# Patient Record
Sex: FEMALE | Race: WHITE | HISPANIC OR LATINO | Employment: FULL TIME | URBAN - METROPOLITAN AREA
[De-identification: names, ages, dates, MRNs, and addresses within clinical notes are randomized per-mention and may not be internally consistent; named-entity substitution may affect disease eponyms.]

---

## 2019-01-15 ENCOUNTER — HOSPITAL ENCOUNTER (OUTPATIENT)
Dept: MRI IMAGING | Facility: HOSPITAL | Age: 59
Discharge: HOME | End: 2019-01-15
Attending: NURSE PRACTITIONER

## 2019-01-25 ENCOUNTER — HOSPITAL ENCOUNTER (OUTPATIENT)
Dept: OTHER | Facility: HOSPITAL | Age: 59
Discharge: HOME | End: 2019-01-25
Attending: NURSE PRACTITIONER

## 2019-01-25 LAB
25(OH)D3 SERPL-MCNC: 16.2 NG/ML (ref 30–100)
ALBUMIN SERPL-MCNC: 4.4 G/DL (ref 3.5–5)
ALBUMIN/GLOB SERPL: 1.5 {RATIO} (ref 1.4–2.6)
ALP SERPL-CCNC: 100 U/L (ref 53–141)
ALT SERPL-CCNC: 36 U/L (ref 10–40)
ANION GAP SERPL CALC-SCNC: 14 MMOL/L (ref 8–19)
AST SERPL-CCNC: 28 U/L (ref 15–50)
BASOPHILS # BLD AUTO: 0.06 10*3/UL (ref 0–0.2)
BASOPHILS NFR BLD AUTO: 0.85 % (ref 0–3)
BILIRUB SERPL-MCNC: 0.77 MG/DL (ref 0.2–1.3)
BUN SERPL-MCNC: 11 MG/DL (ref 5–25)
BUN/CREAT SERPL: 14 {RATIO} (ref 6–20)
CALCIUM SERPL-MCNC: 9.3 MG/DL (ref 8.7–10.4)
CHLORIDE SERPL-SCNC: 106 MMOL/L (ref 99–111)
CHOLEST SERPL-MCNC: 206 MG/DL (ref 107–200)
CHOLEST/HDLC SERPL: 5 {RATIO} (ref 3–6)
CONV CO2: 26 MMOL/L (ref 22–32)
CONV TOTAL PROTEIN: 7.3 G/DL (ref 6.3–8.2)
CREAT UR-MCNC: 0.79 MG/DL (ref 0.5–0.9)
EOSINOPHIL # BLD AUTO: 0.14 10*3/UL (ref 0–0.7)
EOSINOPHIL # BLD AUTO: 1.85 % (ref 0–7)
ERYTHROCYTE [DISTWIDTH] IN BLOOD BY AUTOMATED COUNT: 12.7 % (ref 11.5–14.5)
FOLATE SERPL-MCNC: 15.1 NG/ML (ref 4.8–20)
GFR SERPLBLD BASED ON 1.73 SQ M-ARVRAT: >60 ML/MIN/{1.73_M2}
GLOBULIN UR ELPH-MCNC: 2.9 G/DL (ref 2–3.5)
GLUCOSE SERPL-MCNC: 102 MG/DL (ref 65–99)
HBA1C MFR BLD: 14 G/DL (ref 12–16)
HCT VFR BLD AUTO: 40.8 % (ref 37–47)
HDLC SERPL-MCNC: 41 MG/DL (ref 40–60)
IRON SATN MFR SERPL: 26 % (ref 20–55)
IRON SERPL-MCNC: 101 UG/DL (ref 60–170)
LDLC SERPL CALC-MCNC: 126 MG/DL (ref 70–100)
LYMPHOCYTES # BLD AUTO: 2.71 10*3/UL (ref 1–5)
MCH RBC QN AUTO: 29.1 PG (ref 27–31)
MCHC RBC AUTO-ENTMCNC: 34.3 G/DL (ref 33–37)
MCV RBC AUTO: 84.6 FL (ref 81–99)
MONOCYTES # BLD AUTO: 0.42 10*3/UL (ref 0.2–1.2)
MONOCYTES NFR BLD AUTO: 5.59 % (ref 3–10)
NEUTROPHILS # BLD AUTO: 4.21 10*3/UL (ref 2–8)
NEUTROPHILS NFR BLD AUTO: 55.8 % (ref 30–85)
NRBC BLD AUTO-RTO: 0 % (ref 0–0.01)
OSMOLALITY SERPL CALC.SUM OF ELEC: 294 MOSM/KG (ref 273–304)
PLATELET # BLD AUTO: 278 10*3/UL (ref 130–400)
PMV BLD AUTO: 8.6 FL (ref 7.4–10.4)
POTASSIUM SERPL-SCNC: 4 MMOL/L (ref 3.5–5.3)
RBC # BLD AUTO: 4.82 10*6/UL (ref 4.2–5.4)
SODIUM SERPL-SCNC: 142 MMOL/L (ref 135–147)
T4 FREE SERPL-MCNC: 1.1 NG/DL (ref 0.9–1.8)
TIBC SERPL-MCNC: 392 UG/DL (ref 245–450)
TRANSFERRIN SERPL-MCNC: 274 MG/DL (ref 250–380)
TRIGL SERPL-MCNC: 196 MG/DL (ref 40–150)
TSH SERPL-ACNC: 1.17 M[IU]/L (ref 0.27–4.2)
VARIANT LYMPHS NFR BLD MANUAL: 35.9 % (ref 20–45)
VIT B12 SERPL-MCNC: 368 PG/ML (ref 211–911)
VLDLC SERPL-MCNC: 39 MG/DL (ref 5–37)
WBC # BLD AUTO: 7.54 10*3/UL (ref 4.8–10.8)

## 2019-02-08 ENCOUNTER — HOSPITAL ENCOUNTER (OUTPATIENT)
Dept: MRI IMAGING | Facility: HOSPITAL | Age: 59
Discharge: HOME | End: 2019-02-08
Attending: NURSE PRACTITIONER

## 2019-03-04 ENCOUNTER — HOSPITAL ENCOUNTER (OUTPATIENT)
Dept: MAMMOGRAPHY | Facility: HOSPITAL | Age: 59
Discharge: HOME | End: 2019-03-04
Attending: NURSE PRACTITIONER

## 2019-03-28 ENCOUNTER — CONVERSION ENCOUNTER (OUTPATIENT)
Dept: NEUROLOGY | Facility: CLINIC | Age: 59
End: 2019-03-28

## 2019-03-28 ENCOUNTER — OFFICE VISIT CONVERTED (OUTPATIENT)
Dept: NEUROSURGERY | Facility: CLINIC | Age: 59
End: 2019-03-28
Attending: NEUROLOGICAL SURGERY

## 2019-05-01 ENCOUNTER — HOSPITAL ENCOUNTER (OUTPATIENT)
Dept: PHYSICAL THERAPY | Facility: CLINIC | Age: 59
Setting detail: RECURRING SERIES
Discharge: STILL ADMITTED OR ADMINISTRATIVE | End: 2019-08-06
Attending: NEUROLOGICAL SURGERY

## 2019-05-17 ENCOUNTER — HOSPITAL ENCOUNTER (OUTPATIENT)
Dept: OTHER | Facility: HOSPITAL | Age: 59
Discharge: HOME | End: 2019-05-17
Attending: NURSE PRACTITIONER

## 2019-05-17 LAB
FOLATE SERPL-MCNC: 10.9 NG/ML (ref 4.8–20)
VIT B12 SERPL-MCNC: 1290 PG/ML (ref 211–911)

## 2019-08-01 ENCOUNTER — HOSPITAL ENCOUNTER (OUTPATIENT)
Dept: LAB | Facility: HOSPITAL | Age: 59
Discharge: HOME | End: 2019-08-01

## 2019-08-07 ENCOUNTER — HOSPITAL ENCOUNTER (OUTPATIENT)
Dept: PHYSICAL THERAPY | Facility: CLINIC | Age: 59
Setting detail: RECURRING SERIES
Discharge: HOME | End: 2019-11-25
Attending: NEUROLOGICAL SURGERY

## 2019-09-09 ENCOUNTER — OFFICE VISIT CONVERTED (OUTPATIENT)
Dept: CARDIOLOGY | Facility: CLINIC | Age: 59
End: 2019-09-09
Attending: INTERNAL MEDICINE

## 2019-10-11 ENCOUNTER — HOSPITAL ENCOUNTER (OUTPATIENT)
Dept: GENERAL RADIOLOGY | Facility: HOSPITAL | Age: 59
Discharge: HOME | End: 2019-10-11
Attending: NURSE PRACTITIONER

## 2019-10-31 ENCOUNTER — OFFICE VISIT (OUTPATIENT)
Dept: SPORTS MEDICINE | Facility: CLINIC | Age: 59
End: 2019-10-31

## 2019-10-31 VITALS
BODY MASS INDEX: 37.16 KG/M2 | SYSTOLIC BLOOD PRESSURE: 128 MMHG | HEART RATE: 82 BPM | HEIGHT: 58 IN | OXYGEN SATURATION: 94 % | WEIGHT: 177 LBS | DIASTOLIC BLOOD PRESSURE: 84 MMHG

## 2019-10-31 DIAGNOSIS — S83.271A COMPLEX TEAR OF LATERAL MENISCUS OF RIGHT KNEE AS CURRENT INJURY, INITIAL ENCOUNTER: Primary | ICD-10-CM

## 2019-10-31 PROCEDURE — 99203 OFFICE O/P NEW LOW 30 MIN: CPT | Performed by: FAMILY MEDICINE

## 2019-10-31 RX ORDER — LISINOPRIL AND HYDROCHLOROTHIAZIDE 12.5; 1 MG/1; MG/1
1 TABLET ORAL EVERY MORNING
Refills: 3 | COMMUNITY
Start: 2019-09-30 | End: 2021-10-07

## 2019-10-31 RX ORDER — HYDROCHLOROTHIAZIDE 12.5 MG/1
TABLET ORAL
Refills: 0 | COMMUNITY
Start: 2019-09-30 | End: 2019-10-31 | Stop reason: SDUPTHER

## 2019-10-31 RX ORDER — CHOLECALCIFEROL TAB 125 MCG (5000 UNIT) 125 MCG (5000 UT)
1 TAB DAILY
Refills: 0 | COMMUNITY
Start: 2019-09-29

## 2019-10-31 RX ORDER — TRAZODONE HYDROCHLORIDE 50 MG/1
50 TABLET ORAL
Refills: 1 | COMMUNITY
Start: 2019-08-22 | End: 2021-10-07

## 2019-10-31 RX ORDER — DICLOFENAC SODIUM 75 MG/1
TABLET, DELAYED RELEASE ORAL
Refills: 5 | COMMUNITY
Start: 2019-08-30

## 2019-10-31 RX ORDER — QUETIAPINE FUMARATE 25 MG/1
25 TABLET, FILM COATED ORAL
Refills: 0 | COMMUNITY
Start: 2019-08-20 | End: 2022-01-07

## 2019-10-31 RX ORDER — DIAZEPAM 10 MG/1
TABLET ORAL
Refills: 0 | COMMUNITY
Start: 2019-10-11 | End: 2021-10-07

## 2019-11-07 ENCOUNTER — TELEPHONE (OUTPATIENT)
Dept: SPORTS MEDICINE | Facility: CLINIC | Age: 59
End: 2019-11-07

## 2019-11-07 NOTE — TELEPHONE ENCOUNTER
Pt states that she has PRP tomorrow and stated that if she had any question before hand to call and discuss with you.  [phone]

## 2019-11-08 ENCOUNTER — OFFICE VISIT (OUTPATIENT)
Dept: SPORTS MEDICINE | Facility: CLINIC | Age: 59
End: 2019-11-08

## 2019-11-08 VITALS
WEIGHT: 177 LBS | BODY MASS INDEX: 37.16 KG/M2 | HEIGHT: 58 IN | DIASTOLIC BLOOD PRESSURE: 66 MMHG | SYSTOLIC BLOOD PRESSURE: 110 MMHG | OXYGEN SATURATION: 95 % | HEART RATE: 78 BPM

## 2019-11-08 DIAGNOSIS — S83.271D COMPLEX TEAR OF LATERAL MENISCUS OF RIGHT KNEE AS CURRENT INJURY, SUBSEQUENT ENCOUNTER: Primary | ICD-10-CM

## 2019-11-08 PROCEDURE — 0232T NJX PLATELET PLASMA: CPT | Performed by: FAMILY MEDICINE

## 2019-11-13 NOTE — PROGRESS NOTES
Here today for PRP injection for right knee lateral meniscus tear.  We again reviewed available data for PRP, pros, cons, alternatives, available data.  Written consent was verified with the patient.    PRP collected and  by standard [employer] ACP protocol.    Under sterile technique and continuous ultrasound visualization PRP was injected directly into the knee joint.  Procedure was tolerated well.    Recommend rest for the first 48 hours as much as possible, followed by relatively light activity the first 10 days.  At that time recommend starting the stability strengthening in the physical therapy context.  Discussed expectations, expect slightly increased pain for the first week and do not expect improvement until 4 to 6 weeks after injection.    Diagnoses and all orders for this visit:    Complex tear of lateral meniscus of right knee as current injury, subsequent encounter

## 2019-12-19 ENCOUNTER — OFFICE VISIT (OUTPATIENT)
Dept: SPORTS MEDICINE | Facility: CLINIC | Age: 59
End: 2019-12-19

## 2019-12-19 VITALS
DIASTOLIC BLOOD PRESSURE: 62 MMHG | HEIGHT: 58 IN | WEIGHT: 178.8 LBS | HEART RATE: 89 BPM | SYSTOLIC BLOOD PRESSURE: 100 MMHG | RESPIRATION RATE: 16 BRPM | BODY MASS INDEX: 37.53 KG/M2 | OXYGEN SATURATION: 98 % | TEMPERATURE: 97.9 F

## 2019-12-19 DIAGNOSIS — S83.271D COMPLEX TEAR OF LATERAL MENISCUS OF RIGHT KNEE AS CURRENT INJURY, SUBSEQUENT ENCOUNTER: Primary | ICD-10-CM

## 2019-12-19 PROCEDURE — 99213 OFFICE O/P EST LOW 20 MIN: CPT | Performed by: FAMILY MEDICINE

## 2019-12-19 NOTE — PROGRESS NOTES
"Rocío is a 59 y.o. year old female follow up on a problem familiar to this examiner.     Chief Complaint   Patient presents with   • Follow-up     prp 6 weeks ago  rt knee       History of Present Illness  HPI   F/u R knee PRP injection. Pain improved 85%.  Shin pain and night pain resolved  Flexion is much better  Still has trouble with full extension and squatting.      I have reviewed the patient's medical, family, and social history in detail and updated the computerized patient record.    Review of Systems   Constitutional: Negative.        /62   Pulse 89   Temp 97.9 °F (36.6 °C)   Resp 16   Ht 147.3 cm (58\")   Wt 81.1 kg (178 lb 12.8 oz)   SpO2 98%   BMI 37.37 kg/m²      Physical Exam    Vital signs reviewed.   General: No acute distress.  Eyes: conjunctiva clear; pupils equally round and reactive  ENT: external ears and nose atraumatic; oropharynx clear  CV: no peripheral edema, 2+ distal pulses  Resp: normal respiratory effort, no use of accessory muscles  Skin: no rashes or wounds; normal turgor  Psych: mood and affect appropriate; recent and remote memory intact  Neuro: sensation to light touch intact    MSK Exam:  Ortho Exam  Right knee: Normal appearance.  There appears to be a very small effusion.  There is mild medial joint line tenderness palpation.  Range of motion but a positive bounce home.  No ligamentous laxity.      Diagnoses and all orders for this visit:    Complex tear of lateral meniscus of right knee as current injury, subsequent encounter    Overall she appears to be responding very well.  We discussed continued conservative management with progressive gentle physical therapy.  I do think would be worthwhile for her to add some anti-inflammatory treatment given her mild effusion to help her continue to progress with therapeutic stability strengthening.  Discussed potential need for repeat injection should she reach plateau in her improvement.  Also discussed potential need for " repeat injection months or years ago.  Follow-up as needed based on that progression.      EMR Dragon/Transcription disclaimer:    Much of this encounter note is an electronic transcription/translation of spoken language to printed text.  The electronic translation of spoken language may permit erroneous, or at times, nonsensical words or phrases to be inadvertently transcribed.  Although I have reviewed the note for such errors some may still exist.

## 2020-07-11 ENCOUNTER — HOSPITAL ENCOUNTER (OUTPATIENT)
Dept: OTHER | Facility: HOSPITAL | Age: 60
Discharge: HOME | End: 2020-07-11
Attending: NURSE PRACTITIONER

## 2020-07-11 ENCOUNTER — HOSPITAL ENCOUNTER (OUTPATIENT)
Dept: OTHER | Facility: HOSPITAL | Age: 60
Discharge: HOME | End: 2020-07-11

## 2020-07-11 LAB
25(OH)D3 SERPL-MCNC: 26.7 NG/ML (ref 30–100)
ALBUMIN SERPL-MCNC: 4.2 G/DL (ref 3.5–5)
ALBUMIN/GLOB SERPL: 1.3 {RATIO} (ref 1.4–2.6)
ALP SERPL-CCNC: 72 U/L (ref 53–141)
ALT SERPL-CCNC: 26 U/L (ref 10–40)
ANION GAP SERPL CALC-SCNC: 16 MMOL/L (ref 8–19)
AST SERPL-CCNC: 23 U/L (ref 15–50)
BASOPHILS # BLD AUTO: 0.06 10*3/UL (ref 0–0.2)
BASOPHILS NFR BLD AUTO: 0.9 % (ref 0–3)
BILIRUB SERPL-MCNC: 0.56 MG/DL (ref 0.2–1.3)
BUN SERPL-MCNC: 15 MG/DL (ref 5–25)
BUN/CREAT SERPL: 19 {RATIO} (ref 6–20)
CALCIUM SERPL-MCNC: 9.3 MG/DL (ref 8.7–10.4)
CHLORIDE SERPL-SCNC: 102 MMOL/L (ref 99–111)
CHOLEST SERPL-MCNC: 223 MG/DL (ref 107–200)
CHOLEST/HDLC SERPL: 5.1 {RATIO} (ref 3–6)
CONV ABS IMM GRAN: 0.02 10*3/UL (ref 0–0.2)
CONV CO2: 25 MMOL/L (ref 22–32)
CONV IMMATURE GRAN: 0.3 % (ref 0–1.8)
CONV TOTAL PROTEIN: 7.4 G/DL (ref 6.3–8.2)
CREAT UR-MCNC: 0.81 MG/DL (ref 0.5–0.9)
DEPRECATED RDW RBC AUTO: 44 FL (ref 36.4–46.3)
EOSINOPHIL # BLD AUTO: 0.11 10*3/UL (ref 0–0.7)
EOSINOPHIL # BLD AUTO: 1.7 % (ref 0–7)
ERYTHROCYTE [DISTWIDTH] IN BLOOD BY AUTOMATED COUNT: 13.6 % (ref 11.7–14.4)
FOLATE SERPL-MCNC: 12.4 NG/ML (ref 4.8–20)
GFR SERPLBLD BASED ON 1.73 SQ M-ARVRAT: >60 ML/MIN/{1.73_M2}
GLOBULIN UR ELPH-MCNC: 3.2 G/DL (ref 2–3.5)
GLUCOSE SERPL-MCNC: 111 MG/DL (ref 65–99)
HCT VFR BLD AUTO: 41 % (ref 37–47)
HDLC SERPL-MCNC: 44 MG/DL (ref 40–60)
HGB BLD-MCNC: 13.2 G/DL (ref 12–16)
IRON SERPL-MCNC: 73 UG/DL (ref 60–170)
LDLC SERPL CALC-MCNC: 155 MG/DL (ref 70–100)
LYMPHOCYTES # BLD AUTO: 2.37 10*3/UL (ref 1–5)
LYMPHOCYTES NFR BLD AUTO: 35.9 % (ref 20–45)
MCH RBC QN AUTO: 28.4 PG (ref 27–31)
MCHC RBC AUTO-ENTMCNC: 32.2 G/DL (ref 33–37)
MCV RBC AUTO: 88.2 FL (ref 81–99)
MONOCYTES # BLD AUTO: 0.38 10*3/UL (ref 0.2–1.2)
MONOCYTES NFR BLD AUTO: 5.8 % (ref 3–10)
NEUTROPHILS # BLD AUTO: 3.66 10*3/UL (ref 2–8)
NEUTROPHILS NFR BLD AUTO: 55.4 % (ref 30–85)
NRBC CBCN: 0 % (ref 0–0.7)
OSMOLALITY SERPL CALC.SUM OF ELEC: 290 MOSM/KG (ref 273–304)
PLATELET # BLD AUTO: 275 10*3/UL (ref 130–400)
PMV BLD AUTO: 11.1 FL (ref 9.4–12.3)
POTASSIUM SERPL-SCNC: 4.1 MMOL/L (ref 3.5–5.3)
RBC # BLD AUTO: 4.65 10*6/UL (ref 4.2–5.4)
SODIUM SERPL-SCNC: 139 MMOL/L (ref 135–147)
TRIGL SERPL-MCNC: 121 MG/DL (ref 40–150)
TSH SERPL-ACNC: 1.49 M[IU]/L (ref 0.27–4.2)
VIT B12 SERPL-MCNC: 418 PG/ML (ref 211–911)
VLDLC SERPL-MCNC: 24 MG/DL (ref 5–37)
WBC # BLD AUTO: 6.6 10*3/UL (ref 4.8–10.8)

## 2020-07-13 LAB
EST. AVERAGE GLUCOSE BLD GHB EST-MCNC: 128 MG/DL
HBA1C MFR BLD: 6.1 % (ref 3.5–5.7)

## 2020-11-11 ENCOUNTER — HOSPITAL ENCOUNTER (OUTPATIENT)
Dept: MAMMOGRAPHY | Facility: HOSPITAL | Age: 60
Discharge: HOME | End: 2020-11-11
Attending: NURSE PRACTITIONER

## 2020-12-30 ENCOUNTER — HOSPITAL ENCOUNTER (OUTPATIENT)
Dept: OTHER | Facility: HOSPITAL | Age: 60
Discharge: HOME | End: 2020-12-30
Attending: INTERNAL MEDICINE

## 2021-01-25 ENCOUNTER — HOSPITAL ENCOUNTER (OUTPATIENT)
Dept: OTHER | Facility: HOSPITAL | Age: 61
Discharge: HOME | End: 2021-01-25
Attending: INTERNAL MEDICINE

## 2021-05-15 VITALS
BODY MASS INDEX: 38.62 KG/M2 | DIASTOLIC BLOOD PRESSURE: 68 MMHG | SYSTOLIC BLOOD PRESSURE: 116 MMHG | HEART RATE: 76 BPM | HEIGHT: 58 IN | WEIGHT: 184 LBS

## 2021-05-15 VITALS
HEIGHT: 58 IN | BODY MASS INDEX: 39.28 KG/M2 | WEIGHT: 187.12 LBS | SYSTOLIC BLOOD PRESSURE: 144 MMHG | DIASTOLIC BLOOD PRESSURE: 81 MMHG

## 2021-08-16 ENCOUNTER — OFFICE VISIT (OUTPATIENT)
Dept: PLASTIC SURGERY | Facility: CLINIC | Age: 61
End: 2021-08-16

## 2021-08-16 ENCOUNTER — TELEPHONE (OUTPATIENT)
Dept: PLASTIC SURGERY | Facility: CLINIC | Age: 61
End: 2021-08-16

## 2021-08-16 VITALS — TEMPERATURE: 98.1 F

## 2021-08-16 DIAGNOSIS — R23.8 FACIAL AGING: Primary | ICD-10-CM

## 2021-08-16 PROCEDURE — COS63: Performed by: NURSE PRACTITIONER

## 2021-08-16 NOTE — TELEPHONE ENCOUNTER
Patient states everything went well with her visit today, she was pleased to be given the information and able to wait and think about how she would like to proceed and what options she would like to pursue.Patient states she had a good experience here today. Overall she is satisfied with her visit. She will be calling back regarding ZO products.

## 2021-08-16 NOTE — PROGRESS NOTES
Consult (discuss skincare)            History of Present Illness  Rocío José is a 61 y.o. female who presents to [facility] to discuss skincare.    Discussed Brown spots and texture of skin.  Patient most concerned about hollow area under eyes with bags, brown spots, and fine line areas around her mouth.     Subjective       Patient has no known allergies.  Allergies Reconciled.    Objective     Temp 98.1 °F (36.7 °C)     There is no height or weight on file to calculate BMI.    Physical Exam   Cardiovascular: Normal rate.     Pulmonary/Chest  Effort normal.     Face: Scattered brown spots, Dynamic greater than static rhytids of forehead, glabella, and periorbital areas, moderately deep NL folds, vertical cutaneous lip lines, no open areas or irritation, bilateral lower tear troughs with herniated fat pads  Result Review :       Procedures         Assessment and Plan      Diagnoses and all orders for this visit:    1. Facial aging (Primary)        Plan:  Recommending some botox around eyes to help with hollowing and or surgical upper and lower bleph.  Use retinol skin care.  Chemical peel.  Microneedling with PRP to help with fine lines around mouth. Started on ZO skin care, GSR, daily power, Brightening cream.        Follow Up     No follow-ups on file.    Patient was given instructions and counseling regarding her condition. Please see specific information pulled into the AVS if appropriate.     Tiffany Barron, JANES  08/16/2021

## 2021-10-07 PROCEDURE — 87086 URINE CULTURE/COLONY COUNT: CPT | Performed by: PHYSICIAN ASSISTANT

## 2021-10-07 PROCEDURE — 87088 URINE BACTERIA CULTURE: CPT | Performed by: PHYSICIAN ASSISTANT

## 2021-10-07 PROCEDURE — 87186 SC STD MICRODIL/AGAR DIL: CPT | Performed by: PHYSICIAN ASSISTANT

## 2021-10-19 ENCOUNTER — TRANSCRIBE ORDERS (OUTPATIENT)
Dept: ADMINISTRATIVE | Facility: HOSPITAL | Age: 61
End: 2021-10-19

## 2021-10-19 DIAGNOSIS — Z12.31 VISIT FOR SCREENING MAMMOGRAM: Primary | ICD-10-CM

## 2021-11-05 ENCOUNTER — TRANSCRIBE ORDERS (OUTPATIENT)
Dept: ADMINISTRATIVE | Facility: HOSPITAL | Age: 61
End: 2021-11-05

## 2021-11-05 DIAGNOSIS — Z12.31 VISIT FOR SCREENING MAMMOGRAM: Primary | ICD-10-CM

## 2021-11-16 ENCOUNTER — HOSPITAL ENCOUNTER (OUTPATIENT)
Dept: MAMMOGRAPHY | Facility: HOSPITAL | Age: 61
Discharge: HOME | End: 2021-11-16
Admitting: NURSE PRACTITIONER

## 2021-11-16 DIAGNOSIS — Z12.31 VISIT FOR SCREENING MAMMOGRAM: ICD-10-CM

## 2021-11-16 PROCEDURE — 77067 SCR MAMMO BI INCL CAD: CPT

## 2021-11-16 PROCEDURE — 77063 BREAST TOMOSYNTHESIS BI: CPT

## 2021-12-07 ENCOUNTER — OFFICE VISIT (OUTPATIENT)
Dept: OBSTETRICS AND GYNECOLOGY | Facility: CLINIC | Age: 61
End: 2021-12-07

## 2021-12-07 VITALS
SYSTOLIC BLOOD PRESSURE: 135 MMHG | DIASTOLIC BLOOD PRESSURE: 78 MMHG | HEART RATE: 84 BPM | BODY MASS INDEX: 38.62 KG/M2 | WEIGHT: 179 LBS | HEIGHT: 57 IN

## 2021-12-07 DIAGNOSIS — Z01.419 WELL WOMAN EXAM WITH ROUTINE GYNECOLOGICAL EXAM: Primary | ICD-10-CM

## 2021-12-07 DIAGNOSIS — R10.2 PELVIC PAIN: ICD-10-CM

## 2021-12-07 DIAGNOSIS — N95.2 VAGINAL ATROPHY: ICD-10-CM

## 2021-12-07 PROCEDURE — 87624 HPV HI-RISK TYP POOLED RSLT: CPT | Performed by: OBSTETRICS & GYNECOLOGY

## 2021-12-07 PROCEDURE — 99214 OFFICE O/P EST MOD 30 MIN: CPT | Performed by: OBSTETRICS & GYNECOLOGY

## 2021-12-07 PROCEDURE — G0123 SCREEN CERV/VAG THIN LAYER: HCPCS | Performed by: OBSTETRICS & GYNECOLOGY

## 2021-12-07 PROCEDURE — 99396 PREV VISIT EST AGE 40-64: CPT | Performed by: OBSTETRICS & GYNECOLOGY

## 2021-12-07 RX ORDER — ESTRADIOL 10 UG/1
1 INSERT VAGINAL 2 TIMES WEEKLY
Qty: 8 TABLET | Refills: 11 | Status: SHIPPED | OUTPATIENT
Start: 2021-12-09

## 2021-12-07 NOTE — PROGRESS NOTES
Well Woman Visit    CC: Scheduled annual well gyn visit  Chief Complaint   Patient presents with   • Gynecologic Exam     WWE       Myriad intake in the past?: Yes    DID NOT QUALIFY TODAY  Tobacco/Nicotine use:  No    Contraception or HRT: Post menopausal

## 2021-12-07 NOTE — PROGRESS NOTES
"Well Woman Visit    CC: WWE     Myriad intake: No  Previously Qualified? NO   Tobacco/Nicotine use:  No    HPI:   61 y.o.No obstetric history on file. Contraception or HRT: Tubal ligation  Patient is complaining of vaginal dryness which she has causing discomfort with intercourse. She is also complaining of intermittent shooting pelvic pain. She states this pain has 1-2 times a week and has been present for the last year. Unaware of any alleviating or aggravating factors.      History: PMHx, Meds, Allergies, PSHx, Social Hx, and POBHx all reviewed and updated.  Yana Ghosh APRN      Review of Systems   Genitourinary: Positive for dyspareunia and vaginal pain.        /78   Pulse 84   Ht 144.8 cm (57\")   Wt 81.2 kg (179 lb)   BMI 38.74 kg/m²     Physical Exam  Vitals and nursing note reviewed. Exam conducted with a chaperone present.   Constitutional:       Appearance: Normal appearance.   Neck:      Thyroid: No thyroid mass or thyromegaly.   Cardiovascular:      Rate and Rhythm: Normal rate and regular rhythm.      Heart sounds: Normal heart sounds.   Pulmonary:      Effort: Pulmonary effort is normal.      Breath sounds: Normal breath sounds.   Chest:   Breasts:      Right: Normal. No mass, nipple discharge or skin change.      Left: Normal. No mass, nipple discharge or skin change.       Abdominal:      General: Abdomen is flat. Bowel sounds are normal.      Palpations: Abdomen is soft.   Genitourinary:     General: Normal vulva.      Exam position: Lithotomy position.      Labia:         Right: No lesion.         Left: No lesion.       Urethra: No prolapse or urethral lesion.      Vagina: Normal. No lesions (mild to moderate atrophy; no narrowing; no levator ani spasm).      Cervix: Normal.      Uterus: Normal.       Adnexa: Right adnexa normal and left adnexa normal.      Rectum: No external hemorrhoid.   Musculoskeletal:      Cervical back: Full passive range of motion without pain.   Neurological: "      Mental Status: She is alert.         ASSESSMENT AND PLAN:  WWE    Diagnoses and all orders for this visit:    1. Well woman exam with routine gynecological exam (Primary)    2. Pelvic pain  Overview:  Sporadic, sharp      Orders:  -     US Non-ob Transvaginal; Future    3. Vaginal atrophy  Overview:  Discussed R/B of localized vaginal estrogen    Orders:  -     estradiol (Vagifem) 10 MCG tablet vaginal tablet; Insert 1 tablet into the vagina 2 (Two) Times a Week.  Dispense: 8 tablet; Refill: 11        Counseling:     HRT R/B/A/SE/E all options including non-FDA approved options reviewed    Preventative:   Colonoscopy  Follow up PCP/Specialist PMHx and Labs    Recommend FLU vaccine this season, R/B discussed  s/p COVID vaccine        She understands the importance of having any ordered tests to be performed in a timely fashion.  The risks of not performing them include, but are not limited to, advanced cancer stages, bone loss from osteoporosis and/or subsequent increase in morbidity and/or mortality.  She is encouraged to review her results online and/or contact or office if she has questions.     Follow Up:  Return for post ultrasound.        Cookie Ford MD  12/07/2021

## 2021-12-15 ENCOUNTER — TELEPHONE (OUTPATIENT)
Dept: OBSTETRICS AND GYNECOLOGY | Facility: CLINIC | Age: 61
End: 2021-12-15

## 2021-12-15 LAB
CONV .: ABNORMAL
CYTOLOGIST CVX/VAG CYTO: ABNORMAL
CYTOLOGY CVX/VAG DOC CYTO: ABNORMAL
CYTOLOGY CVX/VAG DOC THIN PREP: ABNORMAL
DX ICD CODE: ABNORMAL
DX ICD CODE: ABNORMAL
HIV 1 & 2 AB SER-IMP: ABNORMAL
HPV I/H RISK 4 DNA CVX QL PROBE+SIG AMP: NEGATIVE
OTHER STN SPEC: ABNORMAL
PATHOLOGIST CVX/VAG CYTO: ABNORMAL
RECOM F/U CVX/VAG CYTO: ABNORMAL
STAT OF ADQ CVX/VAG CYTO-IMP: ABNORMAL

## 2021-12-15 NOTE — PROGRESS NOTES
Pap is ASCUS HPV negative.  Please inform pt this will be treated as a normal pap smear and repeat it in a year

## 2021-12-15 NOTE — TELEPHONE ENCOUNTER
----- Message from Cookie Ford MD sent at 12/15/2021  9:15 AM EST -----  Pap is ASCUS HPV negative.  Please inform pt this will be treated as a normal pap smear and repeat it in a year

## 2021-12-30 ENCOUNTER — HOSPITAL ENCOUNTER (OUTPATIENT)
Dept: ULTRASOUND IMAGING | Facility: HOSPITAL | Age: 61
Discharge: HOME | End: 2021-12-30
Admitting: OBSTETRICS & GYNECOLOGY

## 2021-12-30 DIAGNOSIS — R10.2 PELVIC PAIN: ICD-10-CM

## 2021-12-30 PROCEDURE — 76830 TRANSVAGINAL US NON-OB: CPT

## 2022-01-07 PROCEDURE — U0004 COV-19 TEST NON-CDC HGH THRU: HCPCS | Performed by: PHYSICIAN ASSISTANT

## 2022-01-10 ENCOUNTER — TRANSCRIBE ORDERS (OUTPATIENT)
Dept: ADMINISTRATIVE | Facility: HOSPITAL | Age: 62
End: 2022-01-10

## 2022-01-10 DIAGNOSIS — U07.1 COVID-19: Primary | ICD-10-CM

## 2023-02-15 ENCOUNTER — TRANSCRIBE ORDERS (OUTPATIENT)
Dept: ADMINISTRATIVE | Facility: HOSPITAL | Age: 63
End: 2023-02-15
Payer: OTHER GOVERNMENT

## 2023-02-15 ENCOUNTER — LAB (OUTPATIENT)
Dept: LAB | Facility: HOSPITAL | Age: 63
End: 2023-02-15
Payer: COMMERCIAL

## 2023-02-15 DIAGNOSIS — E78.2 MIXED HYPERLIPIDEMIA: ICD-10-CM

## 2023-02-15 DIAGNOSIS — G43.809 OTHER MIGRAINE WITHOUT STATUS MIGRAINOSUS, NOT INTRACTABLE: ICD-10-CM

## 2023-02-15 DIAGNOSIS — G43.809 OTHER MIGRAINE WITHOUT STATUS MIGRAINOSUS, NOT INTRACTABLE: Primary | ICD-10-CM

## 2023-02-15 DIAGNOSIS — D51.9 ANEMIA DUE TO VITAMIN B12 DEFICIENCY, UNSPECIFIED B12 DEFICIENCY TYPE: ICD-10-CM

## 2023-02-15 DIAGNOSIS — E55.9 VITAMIN D DEFICIENCY, UNSPECIFIED: ICD-10-CM

## 2023-02-15 DIAGNOSIS — I10 ESSENTIAL (PRIMARY) HYPERTENSION: ICD-10-CM

## 2023-02-15 LAB
25(OH)D3 SERPL-MCNC: 20.3 NG/ML (ref 30–100)
ALBUMIN SERPL-MCNC: 4.1 G/DL (ref 3.5–5.2)
ALBUMIN/GLOB SERPL: 1.3 G/DL
ALP SERPL-CCNC: 87 U/L (ref 39–117)
ALT SERPL W P-5'-P-CCNC: 50 U/L (ref 1–33)
ANION GAP SERPL CALCULATED.3IONS-SCNC: 9 MMOL/L (ref 5–15)
AST SERPL-CCNC: 42 U/L (ref 1–32)
BASOPHILS # BLD AUTO: 0.03 10*3/MM3 (ref 0–0.2)
BASOPHILS NFR BLD AUTO: 0.6 % (ref 0–1.5)
BILIRUB SERPL-MCNC: 0.9 MG/DL (ref 0–1.2)
BUN SERPL-MCNC: 13 MG/DL (ref 8–23)
BUN/CREAT SERPL: 15.1 (ref 7–25)
CALCIUM SPEC-SCNC: 9.4 MG/DL (ref 8.6–10.5)
CHLORIDE SERPL-SCNC: 104 MMOL/L (ref 98–107)
CHOLEST SERPL-MCNC: 165 MG/DL (ref 0–200)
CO2 SERPL-SCNC: 28 MMOL/L (ref 22–29)
CREAT SERPL-MCNC: 0.86 MG/DL (ref 0.57–1)
DEPRECATED RDW RBC AUTO: 39.2 FL (ref 37–54)
EGFRCR SERPLBLD CKD-EPI 2021: 76 ML/MIN/1.73
EOSINOPHIL # BLD AUTO: 0.12 10*3/MM3 (ref 0–0.4)
EOSINOPHIL NFR BLD AUTO: 2.3 % (ref 0.3–6.2)
ERYTHROCYTE [DISTWIDTH] IN BLOOD BY AUTOMATED COUNT: 13 % (ref 12.3–15.4)
GLOBULIN UR ELPH-MCNC: 3.2 GM/DL
GLUCOSE SERPL-MCNC: 103 MG/DL (ref 65–99)
HCT VFR BLD AUTO: 39.7 % (ref 34–46.6)
HDLC SERPL-MCNC: 34 MG/DL (ref 40–60)
HGB BLD-MCNC: 13.5 G/DL (ref 12–15.9)
IMM GRANULOCYTES # BLD AUTO: 0.02 10*3/MM3 (ref 0–0.05)
IMM GRANULOCYTES NFR BLD AUTO: 0.4 % (ref 0–0.5)
IRON 24H UR-MRATE: 91 MCG/DL (ref 37–145)
LDLC SERPL CALC-MCNC: 114 MG/DL (ref 0–100)
LDLC/HDLC SERPL: 3.34 {RATIO}
LYMPHOCYTES # BLD AUTO: 1.66 10*3/MM3 (ref 0.7–3.1)
LYMPHOCYTES NFR BLD AUTO: 31.7 % (ref 19.6–45.3)
MCH RBC QN AUTO: 28.1 PG (ref 26.6–33)
MCHC RBC AUTO-ENTMCNC: 34 G/DL (ref 31.5–35.7)
MCV RBC AUTO: 82.7 FL (ref 79–97)
MONOCYTES # BLD AUTO: 0.39 10*3/MM3 (ref 0.1–0.9)
MONOCYTES NFR BLD AUTO: 7.5 % (ref 5–12)
NEUTROPHILS NFR BLD AUTO: 3.01 10*3/MM3 (ref 1.7–7)
NEUTROPHILS NFR BLD AUTO: 57.5 % (ref 42.7–76)
NRBC BLD AUTO-RTO: 0 /100 WBC (ref 0–0.2)
PLATELET # BLD AUTO: 273 10*3/MM3 (ref 140–450)
PMV BLD AUTO: 10.5 FL (ref 6–12)
POTASSIUM SERPL-SCNC: 3.8 MMOL/L (ref 3.5–5.2)
PROT SERPL-MCNC: 7.3 G/DL (ref 6–8.5)
RBC # BLD AUTO: 4.8 10*6/MM3 (ref 3.77–5.28)
SODIUM SERPL-SCNC: 141 MMOL/L (ref 136–145)
T4 FREE SERPL-MCNC: 1.35 NG/DL (ref 0.93–1.7)
TRIGL SERPL-MCNC: 88 MG/DL (ref 0–150)
TSH SERPL DL<=0.05 MIU/L-ACNC: 1.84 UIU/ML (ref 0.27–4.2)
VIT B12 BLD-MCNC: 487 PG/ML (ref 211–946)
VLDLC SERPL-MCNC: 17 MG/DL (ref 5–40)
WBC NRBC COR # BLD: 5.23 10*3/MM3 (ref 3.4–10.8)

## 2023-02-15 PROCEDURE — 80061 LIPID PANEL: CPT

## 2023-02-15 PROCEDURE — 83540 ASSAY OF IRON: CPT

## 2023-02-15 PROCEDURE — 36415 COLL VENOUS BLD VENIPUNCTURE: CPT

## 2023-02-15 PROCEDURE — 82607 VITAMIN B-12: CPT

## 2023-02-15 PROCEDURE — 80050 GENERAL HEALTH PANEL: CPT

## 2023-02-15 PROCEDURE — 82306 VITAMIN D 25 HYDROXY: CPT

## 2023-02-15 PROCEDURE — 84439 ASSAY OF FREE THYROXINE: CPT

## 2023-02-20 ENCOUNTER — TELEPHONE (OUTPATIENT)
Dept: SPORTS MEDICINE | Facility: CLINIC | Age: 63
End: 2023-02-20

## 2023-02-20 NOTE — TELEPHONE ENCOUNTER
Caller: Rocío José    Relationship to patient: Self    Best call back number: [phone]    Patient is needing: PATIENT WOULD LIKE TO SCHEDULE AN INJECTION FOR HER LEFT KNEE. PREFERS APPTS AFTER 1PM

## 2023-03-06 ENCOUNTER — OFFICE VISIT (OUTPATIENT)
Dept: SPORTS MEDICINE | Facility: CLINIC | Age: 63
End: 2023-03-06
Payer: OTHER GOVERNMENT

## 2023-03-06 ENCOUNTER — TELEPHONE (OUTPATIENT)
Dept: SPORTS MEDICINE | Facility: CLINIC | Age: 63
End: 2023-03-06
Payer: OTHER GOVERNMENT

## 2023-03-06 VITALS
HEIGHT: 58 IN | OXYGEN SATURATION: 98 % | SYSTOLIC BLOOD PRESSURE: 132 MMHG | DIASTOLIC BLOOD PRESSURE: 76 MMHG | HEART RATE: 97 BPM | TEMPERATURE: 97.7 F | BODY MASS INDEX: 36.38 KG/M2

## 2023-03-06 DIAGNOSIS — M17.12 PRIMARY OSTEOARTHRITIS OF LEFT KNEE: Primary | ICD-10-CM

## 2023-03-06 DIAGNOSIS — S83.232D COMPLEX TEAR OF MEDIAL MENISCUS OF LEFT KNEE AS CURRENT INJURY, SUBSEQUENT ENCOUNTER: ICD-10-CM

## 2023-03-06 PROCEDURE — 99203 OFFICE O/P NEW LOW 30 MIN: CPT | Performed by: FAMILY MEDICINE

## 2023-03-06 NOTE — PROGRESS NOTES
"Rocío is a 62 y.o. year old female     Chief Complaint   Patient presents with   • Knee Pain     LT KNEE- cant kneel on it without pain. Would like a PRP injection. MRI showed menicus tear       History of Present Illness  HPI     Answers for HPI/ROS submitted by the patient on 3/5/2023  Please describe your symptoms.: L knee pain. X-ay and MI have been taken.  Have you had these symptoms before?: No  How long have you been having these symptoms?: Greater than 2 weeks  Please list any medications you are currently taking for this condition.: None  Please describe any probable cause for these symptoms. : In December 2020 I fell on concrete landing on L knee.  When I kneel on L knee just right it sends a zing thru it.  It has pain with extension after sitting with L hip internally rotated and knee flexed.  What is the primary reason for your visit?: Other    Here today for left knee pain.  She describes history of falling onto the left knee in 2020, has had intermittent pain since then.  Recently had an MRI showing arthritis and a meniscus tear.  She is overall tolerating this reasonably well but wants to discuss possible use of PRP for longer-term success.    Review of Systems    /76 (BP Location: Right arm, Patient Position: Sitting, Cuff Size: Adult)   Pulse 97   Temp 97.7 °F (36.5 °C) (Temporal)   Ht 147.3 cm (57.99\")   SpO2 98%   BMI 36.38 kg/m²      Physical Exam    Vital signs reviewed.   General: No acute distress.      MSK Exam:  Ortho Exam  Left knee: Normal appearance.  No effusion.  There is medial joint line tenderness palpation.  Normal range of motion but there is pain with Keith maneuver.  No ligamentous laxity noted.      Diagnoses and all orders for this visit:    Primary osteoarthritis of left knee    Complex tear of medial meniscus of left knee as current injury, subsequent encounter    I reviewed the report of her MRI from 1/20/2023.  This is consistent with arthritis and a medial " meniscus tear.  We discussed options and I agree that PRP injection would be very reasonable.  We will arrange this at her convenience.      EMR Dragon/Transcription disclaimer:    Much of this encounter note is an electronic transcription/translation of spoken language to printed text.  The electronic translation of spoken language may permit erroneous, or at times, nonsensical words or phrases to be inadvertently transcribed.  Although I have reviewed the note for such errors some may still exist.

## 2023-03-14 ENCOUNTER — PROCEDURE VISIT (OUTPATIENT)
Dept: SPORTS MEDICINE | Facility: CLINIC | Age: 63
End: 2023-03-14

## 2023-03-14 VITALS
DIASTOLIC BLOOD PRESSURE: 78 MMHG | TEMPERATURE: 97.4 F | SYSTOLIC BLOOD PRESSURE: 142 MMHG | HEART RATE: 67 BPM | OXYGEN SATURATION: 97 %

## 2023-03-14 DIAGNOSIS — M17.12 PRIMARY OSTEOARTHRITIS OF LEFT KNEE: Primary | ICD-10-CM

## 2023-03-22 NOTE — PROGRESS NOTES
Here today for PRP injection to the left knee as previously discussed.    PRP was collected and  using [employer] ACP system.  This had to be done twice due to separation failure of the first sample.    Under sterile technique and continuous ultrasound visualization the left knee was injected from intra-articular approach superior lateral, transverse to the extremity in plane with ultrasound.  25-gauge needle was used.  For mL PRP was easily injected knee without difficulty.  Simple bandage applied.    Diagnoses and all orders for this visit:    Primary osteoarthritis of left knee    Discussed options and expectations.  Expect increased pain for 1 week and may not see benefits 1 month.

## 2023-05-19 NOTE — PROGRESS NOTES
Chief Complaint  Follow-up (Dry skin underneath right eye from zo skincare)    Subjective          History of Present Illness  Rocío José is a 62 y.o. female who presents to [facility] for dry patchy skin underneath right eye. Has been using our zo products everyday for 2 years and the flakiness just started a couple months ago. Has been using growth factor serum and intense eye repair but just ran out. Has never seen dermatology but was going to see us first to see if we knew of any options.  No history of skin cancer. Flaky area has been getting smaller. The area does not bleed.      Allergies: Patient has no known allergies.  Allergies Reconciled.    Review of Systems     Objective     There were no vitals taken for this visit.    There is no height or weight on file to calculate BMI.    Physical Exam   Cardiovascular: Normal rate.     Pulmonary/Chest  Effort normal.       Head and Face  - Skin is intact with no open areas, brown flaky spot 0.5 cm on right side of nare, no tenderness, has pigmented lesion on left cheek, no pain.    Result Review :         Assessment and Plan      Diagnoses and all orders for this visit:    1. Facial aging (Primary)        Plan:  Photos obtained today. Advised patient to hold off on intense eye cream. If flaky area that is concerning her is healing and getting better then it may be the retinol in the product, but if area gets worse or larger then we recommend going to dermatology to have area frozen. Patient is aware. Recommended pigmented laser for left cheek pigmented lesion. Patient is wanting to think on this and will call us back and let us know if she wants to schedule this.    Scribed by Aimee Glover MA, acting as a scribe for JANES Box, 05/22/23 16:21 EDT.  JANES Box's signature on the note affirms that the note adequately documents the care provided.        Patient was given  instructions and counseling regarding her condition. Please see specific information pulled into the AVS if appropriate.   Cosmetic Visit  Aimee Glover MA  05/22/2023

## 2023-05-22 ENCOUNTER — CONSULT (OUTPATIENT)
Dept: PLASTIC SURGERY | Facility: CLINIC | Age: 63
End: 2023-05-22

## 2023-05-22 DIAGNOSIS — R23.8 FACIAL AGING: Primary | ICD-10-CM

## 2023-05-22 RX ORDER — OMEPRAZOLE 40 MG/1
CAPSULE, DELAYED RELEASE ORAL
COMMUNITY

## 2023-05-22 RX ORDER — BACLOFEN 20 MG/1
TABLET ORAL
COMMUNITY
Start: 2023-04-25 | End: 2023-05-22

## 2023-05-22 RX ORDER — QUETIAPINE FUMARATE 50 MG/1
50 TABLET, FILM COATED ORAL
COMMUNITY
Start: 2023-05-15

## 2023-05-22 RX ORDER — LOSARTAN POTASSIUM 25 MG/1
TABLET ORAL EVERY 24 HOURS
COMMUNITY

## 2023-05-22 RX ORDER — CLONAZEPAM 1 MG/1
TABLET ORAL
COMMUNITY
Start: 2023-05-10

## 2023-05-22 RX ORDER — CLOBETASOL PROPIONATE 0.5 MG/G
CREAM TOPICAL
COMMUNITY
End: 2023-05-22

## 2023-05-22 RX ORDER — TRAZODONE HYDROCHLORIDE 100 MG/1
TABLET ORAL EVERY 12 HOURS SCHEDULED
COMMUNITY
End: 2023-05-22

## 2023-07-24 ENCOUNTER — TELEPHONE (OUTPATIENT)
Dept: PLASTIC SURGERY | Facility: CLINIC | Age: 63
End: 2023-07-24
Payer: OTHER GOVERNMENT

## 2023-07-24 ENCOUNTER — TRANSCRIBE ORDERS (OUTPATIENT)
Dept: ADMINISTRATIVE | Facility: HOSPITAL | Age: 63
End: 2023-07-24
Payer: OTHER GOVERNMENT

## 2023-07-24 DIAGNOSIS — K76.0 FATTY LIVER: ICD-10-CM

## 2023-07-24 DIAGNOSIS — E78.2 MIXED HYPERLIPIDEMIA: ICD-10-CM

## 2023-07-24 DIAGNOSIS — K21.9 GASTROESOPHAGEAL REFLUX DISEASE WITHOUT ESOPHAGITIS: ICD-10-CM

## 2023-07-24 DIAGNOSIS — I10 PRIMARY HYPERTENSION: ICD-10-CM

## 2023-07-24 DIAGNOSIS — E55.9 VITAMIN D DEFICIENCY: ICD-10-CM

## 2023-07-24 DIAGNOSIS — D51.9 ANEMIA DUE TO VITAMIN B12 DEFICIENCY, UNSPECIFIED B12 DEFICIENCY TYPE: Primary | ICD-10-CM

## 2023-07-24 NOTE — TELEPHONE ENCOUNTER
Left Voicemail in regard to a $50 cosmetic consult fee that was mistakenly added to the patients chart. I informed her that it has been removed from her account, and to call our office with any further questions.

## 2023-08-26 ENCOUNTER — LAB (OUTPATIENT)
Dept: LAB | Facility: HOSPITAL | Age: 63
End: 2023-08-26
Payer: COMMERCIAL

## 2023-08-26 DIAGNOSIS — I10 PRIMARY HYPERTENSION: ICD-10-CM

## 2023-08-26 DIAGNOSIS — E55.9 VITAMIN D DEFICIENCY: ICD-10-CM

## 2023-08-26 DIAGNOSIS — K21.9 GASTROESOPHAGEAL REFLUX DISEASE WITHOUT ESOPHAGITIS: ICD-10-CM

## 2023-08-26 DIAGNOSIS — E78.2 MIXED HYPERLIPIDEMIA: ICD-10-CM

## 2023-08-26 DIAGNOSIS — D51.9 ANEMIA DUE TO VITAMIN B12 DEFICIENCY, UNSPECIFIED B12 DEFICIENCY TYPE: ICD-10-CM

## 2023-08-26 DIAGNOSIS — K76.0 FATTY LIVER: ICD-10-CM

## 2023-08-26 LAB
25(OH)D3 SERPL-MCNC: 21.1 NG/ML (ref 30–100)
ALBUMIN SERPL-MCNC: 4.2 G/DL (ref 3.5–5.2)
ALBUMIN/GLOB SERPL: 1.4 G/DL
ALP SERPL-CCNC: 87 U/L (ref 39–117)
ALT SERPL W P-5'-P-CCNC: 29 U/L (ref 1–33)
ANION GAP SERPL CALCULATED.3IONS-SCNC: 9 MMOL/L (ref 5–15)
AST SERPL-CCNC: 24 U/L (ref 1–32)
BASOPHILS # BLD AUTO: 0.08 10*3/MM3 (ref 0–0.2)
BASOPHILS NFR BLD AUTO: 1.4 % (ref 0–1.5)
BILIRUB SERPL-MCNC: 0.6 MG/DL (ref 0–1.2)
BUN SERPL-MCNC: 12 MG/DL (ref 8–23)
BUN/CREAT SERPL: 16 (ref 7–25)
CALCIUM SPEC-SCNC: 9 MG/DL (ref 8.6–10.5)
CHLORIDE SERPL-SCNC: 109 MMOL/L (ref 98–107)
CHOLEST SERPL-MCNC: 212 MG/DL (ref 0–200)
CO2 SERPL-SCNC: 25 MMOL/L (ref 22–29)
CREAT SERPL-MCNC: 0.75 MG/DL (ref 0.57–1)
DEPRECATED RDW RBC AUTO: 42.3 FL (ref 37–54)
EGFRCR SERPLBLD CKD-EPI 2021: 89 ML/MIN/1.73
EOSINOPHIL # BLD AUTO: 0.15 10*3/MM3 (ref 0–0.4)
EOSINOPHIL NFR BLD AUTO: 2.6 % (ref 0.3–6.2)
ERYTHROCYTE [DISTWIDTH] IN BLOOD BY AUTOMATED COUNT: 13.8 % (ref 12.3–15.4)
FOLATE SERPL-MCNC: 9.89 NG/ML (ref 4.78–24.2)
GLOBULIN UR ELPH-MCNC: 2.9 GM/DL
GLUCOSE SERPL-MCNC: 97 MG/DL (ref 65–99)
HCT VFR BLD AUTO: 38.2 % (ref 34–46.6)
HDLC SERPL-MCNC: 52 MG/DL (ref 40–60)
HGB BLD-MCNC: 12.3 G/DL (ref 12–15.9)
IMM GRANULOCYTES # BLD AUTO: 0.01 10*3/MM3 (ref 0–0.05)
IMM GRANULOCYTES NFR BLD AUTO: 0.2 % (ref 0–0.5)
IRON 24H UR-MRATE: 63 MCG/DL (ref 37–145)
LDLC SERPL CALC-MCNC: 146 MG/DL (ref 0–100)
LDLC/HDLC SERPL: 2.77 {RATIO}
LYMPHOCYTES # BLD AUTO: 1.65 10*3/MM3 (ref 0.7–3.1)
LYMPHOCYTES NFR BLD AUTO: 28.7 % (ref 19.6–45.3)
MCH RBC QN AUTO: 27.1 PG (ref 26.6–33)
MCHC RBC AUTO-ENTMCNC: 32.2 G/DL (ref 31.5–35.7)
MCV RBC AUTO: 84.1 FL (ref 79–97)
MONOCYTES # BLD AUTO: 0.34 10*3/MM3 (ref 0.1–0.9)
MONOCYTES NFR BLD AUTO: 5.9 % (ref 5–12)
NEUTROPHILS NFR BLD AUTO: 3.52 10*3/MM3 (ref 1.7–7)
NEUTROPHILS NFR BLD AUTO: 61.2 % (ref 42.7–76)
NRBC BLD AUTO-RTO: 0 /100 WBC (ref 0–0.2)
PLATELET # BLD AUTO: 287 10*3/MM3 (ref 140–450)
PMV BLD AUTO: 10.9 FL (ref 6–12)
POTASSIUM SERPL-SCNC: 4.4 MMOL/L (ref 3.5–5.2)
PROT SERPL-MCNC: 7.1 G/DL (ref 6–8.5)
RBC # BLD AUTO: 4.54 10*6/MM3 (ref 3.77–5.28)
SODIUM SERPL-SCNC: 143 MMOL/L (ref 136–145)
TRIGL SERPL-MCNC: 81 MG/DL (ref 0–150)
TSH SERPL DL<=0.05 MIU/L-ACNC: 1.55 UIU/ML (ref 0.27–4.2)
VIT B12 BLD-MCNC: 329 PG/ML (ref 211–946)
VLDLC SERPL-MCNC: 14 MG/DL (ref 5–40)
WBC NRBC COR # BLD: 5.75 10*3/MM3 (ref 3.4–10.8)

## 2023-08-26 PROCEDURE — 80053 COMPREHEN METABOLIC PANEL: CPT

## 2023-08-26 PROCEDURE — 85025 COMPLETE CBC W/AUTO DIFF WBC: CPT

## 2023-08-26 PROCEDURE — 83540 ASSAY OF IRON: CPT

## 2023-08-26 PROCEDURE — 36415 COLL VENOUS BLD VENIPUNCTURE: CPT

## 2023-08-26 PROCEDURE — 80061 LIPID PANEL: CPT

## 2023-08-26 PROCEDURE — 84443 ASSAY THYROID STIM HORMONE: CPT

## 2023-08-26 PROCEDURE — 82746 ASSAY OF FOLIC ACID SERUM: CPT

## 2023-08-26 PROCEDURE — 82306 VITAMIN D 25 HYDROXY: CPT

## 2023-08-26 PROCEDURE — 82607 VITAMIN B-12: CPT

## 2023-12-06 ENCOUNTER — TRANSCRIBE ORDERS (OUTPATIENT)
Dept: ADMINISTRATIVE | Facility: HOSPITAL | Age: 63
End: 2023-12-06
Payer: OTHER GOVERNMENT

## 2023-12-06 DIAGNOSIS — Z12.31 SCREENING MAMMOGRAM FOR HIGH-RISK PATIENT: Primary | ICD-10-CM

## 2023-12-21 ENCOUNTER — HOSPITAL ENCOUNTER (OUTPATIENT)
Dept: MAMMOGRAPHY | Facility: HOSPITAL | Age: 63
Discharge: HOME | End: 2023-12-21
Admitting: FAMILY MEDICINE
Payer: COMMERCIAL

## 2023-12-21 DIAGNOSIS — Z12.31 SCREENING MAMMOGRAM FOR HIGH-RISK PATIENT: ICD-10-CM

## 2023-12-21 PROCEDURE — 77067 SCR MAMMO BI INCL CAD: CPT

## 2023-12-21 PROCEDURE — 77063 BREAST TOMOSYNTHESIS BI: CPT

## 2024-02-12 NOTE — PROGRESS NOTES
"Chief Complaint: worsening bags under her eyes, wrinkles and brown spotsConsult (Spots under her eyes)            History of Present Illness  Rocío José is a 63 y.o. female who presents to [facility] as a consult for concerns about the bags under her eyes. Patient has had microneedling  and a cool peel but did not get a good result and made her eyes worse. Patient has no seasonal allergies. Patient does have concerns with vision loss and has to tilt her head at times to see certain things. She is worried about pigmented skin with surgeries.        Subjective  what can I do about my skin     Patient has no known allergies.  Allergies Reconciled.    Review of Systems   Constitutional: Negative.    HENT: Negative.     Eyes:  Positive for visual disturbance.   Respiratory: Negative.     Cardiovascular: Negative.    Gastrointestinal: Negative.    Endocrine: Negative.    Genitourinary: Negative.    Musculoskeletal: Negative.    Skin:  Positive for color change.   Allergic/Immunologic: Negative.    Neurological: Negative.    Hematological: Negative.    Psychiatric/Behavioral: Negative.         All review of system has been reviewed and it  is negative except the ones note above.     Objective     /94 (BP Location: Left arm, Patient Position: Sitting, Cuff Size: Adult)   Pulse 83   Temp 98.4 °F (36.9 °C) (Temporal)   Ht 147.3 cm (57.99\")   Wt 87.5 kg (193 lb)   SpO2 95%   BMI 40.35 kg/m²     Body mass index is 40.35 kg/m².    Physical Exam   Vitals reviewed.  Constitutional  She appears well-developed and well-nourished.     Eyes  System normal.       Cardiovascular: Normal rate.     Pulmonary/Chest  Effort normal.     Skin  Skin is warm and dry.     Psychiatric  She has a normal mood and affect.     Face  Her facial skeleton is symmetrical. She shows laxity skin tone. Her skin is a III on the Estrella scale.Her facial expression is fully " symmetric. She has facial weakness in the following areas: none on the right, and none on the left. She has jowls.     Forehead and Brow  Forehead skin is laxity.       Eyes:    VISIA analysis was obtained and discussed with the patient.  Visia:  Spots: 15%  Wrinkles: 76%  Texture: 68%  Pores: 23%  UV Spots: 13%  Brown Spots: 9%  Red Areas: 19%  Porphyrins: 80%       Result Review : reviewed Visia results with patient      Procedures    Assessment and Plan      Diagnoses and all orders for this visit:    1. Facial aging (Primary)      Plan:  Photos obtained today in clinic.   We will call patient with cosmetic quote for bilateral lower bleph and removal of nasojugal fat pads.   Discussed with patient she would have bruising and swelling up to 2 weeks after surgery. Patient is aware she could plan  to take 2 weeks off of work as she will not be able to wear makeup and this is a concern for her. She is going to think about this and let us know because she is also worried about pigmentation of scarring.   Discussed bilateral upper lid ptosis, visual disturbances and obtaining a visual field study from Dr. Nagi Parry obtained and reviewed results with patient. Recommended for dark spots she would need hydroquinone with vitamin c to reduce and fade pigmented areas on her face. Gave patient skin care regimen recommendation and scanned into patient's chart.  Discussed other options to address the lines and wrinkles that were also a concern, Sculptra, Skin Vive and fillers for cheek structure, nasal labial folds   Patient will follow up as needed and once she decides on if she wants surgery.    45 minutes spent with patient discussing options and what to expect post op and long term.    Scribed by Aimee Glover MA, acting as a scribe for JANES Dior, 02/23/24 11:09 EST.  JANES Dior's signature on the note affirms that the note adequately documents the care provided.          Patient was given  instructions and counseling regarding her condition. Please see specific information pulled into the AVS if appropriate.     Aimee Glover MA  02/23/2024

## 2024-02-21 NOTE — PROGRESS NOTES
Well Woman Visit    CC: Scheduled annual well gyn visit  Chief Complaint   Patient presents with    Annual Exam       Myriad intake in the past?: Yes    Previously Qualified? NO    Post menopausal, using no HRT    HPI:   63 y.o.       PCP: does manage PMHx and preventative labs  History: PMHx, Meds, Allergies, PSHx, Social Hx, and POBHx all reviewed and updated.    Pt has no complaints today.    PHYSICAL EXAM:  /82   Pulse 96   Wt 88 kg (194 lb)   Breastfeeding No   BMI 40.55 kg/m²  Not found.  General- NAD, alert and oriented, appropriate  Psych- Normal mood, good memory  Neck- No masses, no thyroid enlargement  CV- Regular rhythm, no murnurs  Resp- CTA to bases, no wheezes  Abdomen- Soft, non distended, non tender, no masses    Breast left-  Bilaterally symmetrical, no masses, non tender, no nipple discharge  Breast right- Bilaterally symmetrical, no masses, non tender, no nipple discharge    External genitalia- Normal female, no lesions, moderate vulvovaginal atrophy with moderate resorption of labia minora  Urethra/meatus- Normal, no masses, non tender  Bladder- Normal, no masses, non tender  Vagina- Normal, moderate atrophy, no lesions, no discharge.    Cvx- Normal, no lesions, no discharge, No cervical motion tenderness  Uterus- Normal size, shape & consistency.  Non tender, mobile.  Adnexa- No mass, non tender  Anus/Rectum/Perineum- Not performed    Lymphatic- No palpable neck, axillary, or groin nodes  Ext- No edema, no cyanosis    Skin- No lesions, no rashes, no acanthosis nigricans      ASSESSMENT and PLAN:    Diagnoses and all orders for this visit:    1. Well woman exam with routine gynecological exam (Primary)  -     IgP, Aptima HPV    2. Atypical squamous cells of undetermined significance on cytologic smear of cervix (ASC-US)    3. Genitourinary syndrome of menopause  Assessment & Plan:  Pt hasn't been using vagifem.  Pt has moderate to severe vulvovaginal atrophy on exam with  moderate resorption of bilateral labia minora.  Counseled re: r/b of localized vaginal estrogen including decreasing OAB symptoms and UTIs    Orders:  -     estradiol (Vagifem) 10 MCG tablet vaginal tablet; Insert 1 tablet into the vagina 2 (Two) Times a Week. (Patient not taking: Reported on 2/23/2024)  Dispense: 8 tablet; Refill: 11        Preventative:  BREAST HEALTH- Monthly self breast exam importance and how to reviewed. MMG and/or MRI (prn) reviewed per society guidelines and her individual history. Screen: Already up to date  CERVICAL CANCER Screening- Reviewed current ASCCP guidelines for screening w and wo cotest HPV, age specific.  Screen: Updated today  COLON CANCER Screening- Reviewed current medical society guidelines and options.  Screen:  Already up to date  Follow up PCP/Specialist PMHx and/or Labs      She understands the importance of having any ordered tests to be performed in a timely fashion.  The risks of not performing them include, but are not limited to, advanced cancer stages, bone loss from osteoporosis and/or subsequent increase in morbidity and/or mortality.  She is encouraged to review her results online and/or contact or office if she has questions.     Follow Up:  Return in about 1 year (around 2/22/2025) for Annual physical.            Cookie Ford MD  02/22/2024    [facility]  [address] [place] [address]  Dept: [phone]  Dept Fax: [phone]  Loc: [phone]  Loc Fax: [phone]

## 2024-02-22 ENCOUNTER — OFFICE VISIT (OUTPATIENT)
Dept: OBSTETRICS AND GYNECOLOGY | Facility: CLINIC | Age: 64
End: 2024-02-22
Payer: OTHER GOVERNMENT

## 2024-02-22 VITALS
BODY MASS INDEX: 40.55 KG/M2 | WEIGHT: 194 LBS | HEART RATE: 96 BPM | DIASTOLIC BLOOD PRESSURE: 82 MMHG | SYSTOLIC BLOOD PRESSURE: 148 MMHG

## 2024-02-22 DIAGNOSIS — N95.8 GENITOURINARY SYNDROME OF MENOPAUSE: ICD-10-CM

## 2024-02-22 DIAGNOSIS — R87.610 ATYPICAL SQUAMOUS CELLS OF UNDETERMINED SIGNIFICANCE ON CYTOLOGIC SMEAR OF CERVIX (ASC-US): ICD-10-CM

## 2024-02-22 DIAGNOSIS — Z01.419 WELL WOMAN EXAM WITH ROUTINE GYNECOLOGICAL EXAM: Primary | ICD-10-CM

## 2024-02-22 PROCEDURE — G0123 SCREEN CERV/VAG THIN LAYER: HCPCS | Performed by: OBSTETRICS & GYNECOLOGY

## 2024-02-22 PROCEDURE — 87624 HPV HI-RISK TYP POOLED RSLT: CPT | Performed by: OBSTETRICS & GYNECOLOGY

## 2024-02-22 RX ORDER — ESTRADIOL 10 UG/1
1 INSERT VAGINAL 2 TIMES WEEKLY
Qty: 8 TABLET | Refills: 11 | Status: SHIPPED | OUTPATIENT
Start: 2024-02-22

## 2024-02-22 RX ORDER — LOSARTAN POTASSIUM 25 MG/1
25 TABLET ORAL EVERY MORNING
COMMUNITY
Start: 2024-01-03

## 2024-02-22 RX ORDER — BISOPROLOL FUMARATE 5 MG/1
1 TABLET, FILM COATED ORAL DAILY
COMMUNITY
Start: 2024-01-03

## 2024-02-23 ENCOUNTER — OFFICE VISIT (OUTPATIENT)
Dept: PLASTIC SURGERY | Facility: CLINIC | Age: 64
End: 2024-02-23

## 2024-02-23 VITALS
DIASTOLIC BLOOD PRESSURE: 94 MMHG | WEIGHT: 193 LBS | SYSTOLIC BLOOD PRESSURE: 153 MMHG | TEMPERATURE: 98.4 F | HEART RATE: 83 BPM | HEIGHT: 58 IN | OXYGEN SATURATION: 95 % | BODY MASS INDEX: 40.51 KG/M2

## 2024-02-23 DIAGNOSIS — H02.403 PTOSIS OF EYELID, BILATERAL: ICD-10-CM

## 2024-02-23 DIAGNOSIS — R23.8 FACIAL AGING: Primary | ICD-10-CM

## 2024-02-23 RX ORDER — KETOCONAZOLE 20 MG/G
CREAM TOPICAL
COMMUNITY
Start: 2024-02-22

## 2024-02-24 PROBLEM — N95.8 GENITOURINARY SYNDROME OF MENOPAUSE: Status: ACTIVE | Noted: 2024-02-24

## 2024-02-24 PROBLEM — N95.8 GENITOURINARY SYNDROME OF MENOPAUSE: Status: ACTIVE | Noted: 2021-12-07

## 2024-02-24 NOTE — ASSESSMENT & PLAN NOTE
Pt hasn't been using vagifem.  Pt has moderate to severe vulvovaginal atrophy on exam with moderate resorption of bilateral labia minora.  Counseled re: r/b of localized vaginal estrogen including decreasing OAB symptoms and UTIs

## 2024-02-29 LAB
CYTOLOGIST CVX/VAG CYTO: ABNORMAL
CYTOLOGY CVX/VAG DOC CYTO: ABNORMAL
CYTOLOGY CVX/VAG DOC THIN PREP: ABNORMAL
DX ICD CODE: ABNORMAL
DX ICD CODE: ABNORMAL
HPV I/H RISK 4 DNA CVX QL PROBE+SIG AMP: NEGATIVE
Lab: ABNORMAL
OTHER STN SPEC: ABNORMAL
PATHOLOGIST CVX/VAG CYTO: ABNORMAL
STAT OF ADQ CVX/VAG CYTO-IMP: ABNORMAL

## 2024-03-04 ENCOUNTER — TELEPHONE (OUTPATIENT)
Dept: OBSTETRICS AND GYNECOLOGY | Facility: CLINIC | Age: 64
End: 2024-03-04
Payer: OTHER GOVERNMENT

## 2024-03-11 ENCOUNTER — TELEPHONE (OUTPATIENT)
Dept: PLASTIC SURGERY | Facility: CLINIC | Age: 64
End: 2024-03-11
Payer: OTHER GOVERNMENT

## 2024-03-11 NOTE — TELEPHONE ENCOUNTER
Lvm for patient to all and talk about quote for COS surgery with Plastics.    Please transfer to office when patient returns phone call.

## 2024-03-26 ENCOUNTER — TELEPHONE (OUTPATIENT)
Dept: PLASTIC SURGERY | Facility: CLINIC | Age: 64
End: 2024-03-26
Payer: OTHER GOVERNMENT

## 2024-04-02 ENCOUNTER — TELEPHONE (OUTPATIENT)
Dept: PLASTIC SURGERY | Facility: CLINIC | Age: 64
End: 2024-04-02
Payer: OTHER GOVERNMENT

## 2024-04-02 NOTE — TELEPHONE ENCOUNTER
LVM FOR PATIENT TO CALL ABOUT QUOTE THAT WAS SENT IN THE MAIL TO HER.    ASKED PATIENT TO CALL THE OFFICE BACK TO LET US KNOW THAT SHE RECEIVED IT PLEASE TRANSFER TO OFFICE WHEN SHE CALLS BACK.

## 2024-05-31 ENCOUNTER — TELEPHONE (OUTPATIENT)
Dept: PLASTIC SURGERY | Facility: CLINIC | Age: 64
End: 2024-05-31
Payer: COMMERCIAL

## 2024-06-04 ENCOUNTER — TELEPHONE (OUTPATIENT)
Dept: PLASTIC SURGERY | Facility: CLINIC | Age: 64
End: 2024-06-04
Payer: COMMERCIAL

## 2024-07-22 ENCOUNTER — OFFICE VISIT (OUTPATIENT)
Dept: SLEEP MEDICINE | Facility: HOSPITAL | Age: 64
End: 2024-07-22
Payer: COMMERCIAL

## 2024-07-22 VITALS
WEIGHT: 191.4 LBS | SYSTOLIC BLOOD PRESSURE: 143 MMHG | OXYGEN SATURATION: 96 % | BODY MASS INDEX: 40.18 KG/M2 | DIASTOLIC BLOOD PRESSURE: 71 MMHG | HEART RATE: 75 BPM | HEIGHT: 58 IN

## 2024-07-22 DIAGNOSIS — E66.01 CLASS 3 SEVERE OBESITY WITH SERIOUS COMORBIDITY AND BODY MASS INDEX (BMI) OF 40.0 TO 44.9 IN ADULT, UNSPECIFIED OBESITY TYPE: ICD-10-CM

## 2024-07-22 DIAGNOSIS — R06.83 SNORING: ICD-10-CM

## 2024-07-22 DIAGNOSIS — G47.19 EXCESSIVE DAYTIME SLEEPINESS: ICD-10-CM

## 2024-07-22 DIAGNOSIS — I10 ESSENTIAL HYPERTENSION: ICD-10-CM

## 2024-07-22 DIAGNOSIS — R06.81 WITNESSED EPISODE OF APNEA: ICD-10-CM

## 2024-07-22 DIAGNOSIS — Z72.821 INADEQUATE SLEEP HYGIENE: ICD-10-CM

## 2024-07-22 DIAGNOSIS — R29.818 SUSPECTED SLEEP APNEA: Primary | ICD-10-CM

## 2024-07-22 PROCEDURE — G0463 HOSPITAL OUTPT CLINIC VISIT: HCPCS | Performed by: FAMILY MEDICINE

## 2024-07-22 PROCEDURE — 99205 OFFICE O/P NEW HI 60 MIN: CPT | Performed by: FAMILY MEDICINE

## 2024-07-22 RX ORDER — QUETIAPINE FUMARATE 100 MG/1
100 TABLET, FILM COATED ORAL
COMMUNITY
Start: 2024-07-19

## 2024-07-22 RX ORDER — CLONAZEPAM 1 MG/1
1 TABLET ORAL
COMMUNITY
Start: 2024-07-18

## 2024-07-22 NOTE — PROGRESS NOTES
[facility]  [address] [place] [address]  [place]   [place] [address]  Phone: [phone]  Fax: [phone]      Rocío José  [id number]   1960  63 y.o.  female      Referring physician/provider and  PCP River Persaud MD    Type of service: Initial Sleep Medicine Consult.  Date of service: 7/22/2024      Chief Complaint   Patient presents with    Snoring    Witnessed Apnea       History of present illness;  The patient was seen today on 7/22/2024 at [facility].    Thank you for asking to see Rocío José, 63 y.o. PMHx HTN, Anxiety (on Seroquel 100 qhs and klonopin prn takes non-weekly - states these medications were initially started by psychiatry but now managed by her PCP), Migraines, Obesity.  The patient presents for initial evaluation of sleep sleep disordered breathing.  Patient  denies prior surgery namely tonsillectomy, nasal surgery or UPPP.     Snoring and witnessed apneas  20 lbs weight gain in recent years  [place] is normal 6/24 she endorses fatigue  Patient is unable to provide me with records of any prior sleep studies  She had a PAP device given to her 10 years+ ago   She will need a sleep study to prove diagnosis and new treatment device if diagnosis is established      She states she has a lot of anxiety thinking about sleep apnea and possible future PAP therapy     Obstructive Sleep Apnea Screening: STOP-BANG Sleep Apnea Questionnaire. Reference: Mil VILLALOBOS et al. Br J Anaesth, 2012.     Criterion    Yes    No  Do you SNORE loudly?   [x]   Yes  []   No   Do you often feel TIRED, fatigued, or sleepy during the day?    [x]   Yes  []   No  Has anyone OBSERVED you stop breathing during your sleep?    [x]   Yes  []   No  Do you have or are you being treated for high blood PRESSURE?    [x]   Yes  []   No  BMI >32 kg/m2     [x]   Yes  []   No  AGE > 50 years    [x]   Yes  []   No  NECK circumference >16 inches / 40 cm    [x]   Yes  []    No  GENDER: male     []   Yes  [x]   No    AMAYA Probability:  []   1-2 - Low  []   3-4 - Intermediate  [x]   5-8 - High    Save what is noted above in HPI, denies any OTHER past known:  cardiopulmonary conditions/neurologic disorders/neuromuscular disorders  Never needed supplemental O2 at home  Denies any opioid therapy  Denies any metal in head/neck/chest      Further Sleep History:    Bedtime: Weekdays 9 PM; weekends 10 PM  Rise Time: Weekdays 5:30 AM; weekends 8 AM  Sleep Latency: 60 minutes - stays in bed if not sleepy   Screens in bed: Yes  Wake after sleep onset: Twice  Reasons for awakenings: Nocturia  Number of naps per day denies  Naps restorative: Not applicable  Caffeine use: at least 2 beverage per day    RLS Symptoms: No   Bruxism:No   Current sleep related gastroesophageal reflux symptoms:  No   Cataplexy:  No   Sleep Paralysis:  No   Hypnagogic or hypnopompic hallucinations: No   Parasomnias such as sleep walking or sleep eating No     Disclaimer Sleep History: The above sleep history is based on this sleep physician's in room encounter with the patient. Pre encounter self administered questionnaires are taken into consideration and discussed with patient for any discordance. The above documentation by this sleep physician is the most accurate clinical information determined by in room sleep physician encounter with patient.     MEDICAL CONDITIONS (PMH)   HTN  Migraines      Social history:  Do you drive a commercial vehicle:  No   Shift work:  No   Tobacco use:  No   Alcohol use: 0 per week  Occupation: PTA    Family Hx (parents and siblings) (pertaining to sleep medicine)  Patient unable to provide    Medications: reviewed    Review of systems is negative unless otherwise noted per HPI   Disclaimer History: The above history is based on this sleep physician's in room encounter with the patient. Pre encounter self administered questionnaires are taken into consideration and discussed with patient for  "any discordance. The above documentation by this sleep physician is the most accurate clinical information determined by in room sleep physician encounter with patient.     Physical exam:  Vitals:    07/22/24 1500   BP: 143/71   Pulse: 75   SpO2: 96%   Weight: 86.8 kg (191 lb 6.4 oz)   Height: 147.3 cm (58\")    Body mass index is 40 kg/m².   CONSTITUTIONAL:  Non-toxic, In no overt distress   Head: normocephalic   ENT: Mallampati class IV, + macroglossia, no septal defects   NECK:Neck Circumference: 17 inches,no nuchal rigidity  RESPIRATORY SYSTEM: Breath sounds are clear (no rales, no rhonchi, no wheezes), no accessory muscle use  CARDIOVASULAR SYSTEM: Heart sounds are regular rhythm and normal rate, no rub, no gallop, no edema  NEUROLOGICAL SYSTEM: Oriented x 3, No gross focal deficits   PSYCHIATRIC SYSTEM: Affect is anxious, intermittently guarded, tangential thought process able to be redirected with extensive counseling required    Records reviewed:   PCP notes not available to me  -3/28/2019 visit with neurosurgery Dr. Buck Dickey   Assessment listeD:  ·Cervicalgia     723.1/M54.2  ·Cervical spondylosis without myelopathy     721.0/M47.812  C4-5 with mild stenosis (canal nearly 11 mm)  Plan  ·Instructions  ·She will continue conservative measures including P.T. and if the pain worsens into the arm, she could consider a C4-5 anterior cervical discectomy and fusion.    PDMP reviewed:        Labs reviewed.  TSH          8/26/2023    10:33   TSH   TSH 1.550     -8/26/23 - no more recent labs  available for my review  Bicarb 23   H&H 12.3/38.2  MCV 84.1    Assessment and plan:  Suspected sleep apnea [R29.818] patient's symptoms and examination is consistent with sleep apnea (G47.30). I have talked to the patient about the signs and symptoms of sleep apnea. In addition, I have also discussed pathophysiology of sleep apnea.  I also discussed the complications of untreated sleep apnea including effects on " hypertension, diabetes mellitus and nonrestorative sleep with hypersomnia which can increase risk for motor vehicle accidents.  Untreated sleep apnea is also a risk factor for development of atrial fibrillation, hypertension, insulin resistance and cerebrovascular accident.  Discussed in detail of various testing methods including home-based and lab based sleep studies.  Based on history and physical examination and other comorbidities the most appropriate study is Home Sleep Study.  [facility].  The test will be scheduled after approval from insurance. Treatment and management will be discussed after the test is completed. I paused MULTIPLE TIMES and gave the patient  MULTIPLE OPPORTUNITIES to ask questions and all the questions were answered. I ensured she verbalized satisfaction with the answers to her questions, and she stated she has no further questions for me by the end of the  visit.  -I spent and extensive amount of time reviewing her risk factors with her, pathophysiology of sleep apnea, consequences of sleep apnea, treatment modalities, consequences of untreated sleep apnea should she refuse therapy if she does receive this diagnosis. The amount of time spent counseling with this patient was much greater than a typical patient encounter.  -She ultimately agreed to testing. I literally italia out for in room the possible outcomes of testing so she can visualize what is potential route for diagnosis/treatment. She remained uncertain about treatment if she is diagnosed with sleep apnea.  -I discussed multiple treatment modalities with her to include PAP/HNS - very poor candidate with her BMI/MAD -Mandibular advancement device: out of all therapies she stated she would most likely want PAP therapy with nasal dreamwear mask (I showed her several masks).   -HOWEVER, by the end of the encounter she did not agree to treatment if PAP therapy necessary, and stated she wanted more  time to think about it.   -If she is diagnosed with sleep apnea she will need return to office visit to discuss treatment - she was made aware of this plan and she verbalized agreement to this plan. If home sleep study does not reveal sleep apnea she must follow up with her primary care physician.   Snoring (R06.83), snoring is the sound created by turbulent airflow vibrating upper airway soft tissue due to limitation of inspiratory airflow. I have also discussed factors affecting snoring including sleep deprivation, sleeping on the back and alcohol ingestion. To minimize snoring, patient is advised to have adequate sleep, sleep on the side and avoid alcohol and sedative medications before bedtime  Obesity, patient's BMI is Body mass index is 40 kg/m².. I have discussed the relationship between weight and sleep apnea.There is direct correlation between weight and severity of sleep apnea.  Weight reduction is encouraged, as it is going to reduce the severity of sleep apnea. I have also discussed with the patient diet and exercise to achieve ideal body weight.  Inadequate sleep hygiene [Z72.821]  Counseled the patient lifestyle modifications as below to be applied especially night of any sleep study, the patient verbalized understanding of same. Follow up with PCP to reinforce my counseling towards healthy lifestyle modifications if sleep studies are negative.  HTN,  Follow up with primary care physician for continued management. This medical condition would make the patient eligible for a trial of PAP therapy even if sleep study reveals mild severity sleep apnea.  Anxiety, Very anxious affect with tangential thought process able to be redirected with counseling. Follow up with primary care physician for continued management. Comorbid mood disorders would make the patient eligible for a trial of PAP therapy even if sleep study reveals mild severity sleep apnea.        I have also discussed with the patient the  following  Stimulus control counseling provided at great length for multiple inadequate circumstances of sleep: Maintain a regular bedtime and wake time, not to watch television or use screens in bed, limit caffeine-containing beverages before bed time and avoid naps during the day, reserve bed for intimacy or sleep only.  If patient encounters 20 minutes or more in bed without sleep, then instructed to get out of bed to chair, in dimly lit or dark area, pursue a boring/non-stimulating activity, and return to bed only when sleepy (repeat this step as often as necessary).  Adequate amount of sleep.  Generally most people needs about 7 to 8 hours of sleep.      Return for 31 to 90 days after PAP setup with down load.    Patient's questions were answered      I once again thank you for asking me to see this patient in consultation and I have forwarded my opinion and treatment plan.  Please do not hesitate to call me if you have any questions.     Time based visit 60 minutes: to include in room history/exam/extensive counseling/review of plan with patient/same day review of medications/records reviewed/PDMP reviewed/review of labs/ / complex medical decision making leading to order for home sleep study with detail plan as above      EMR Dragon/Transcription disclaimer:   Much of this encounter note is an electronic transcription/translation of spoken language to printed text. The electronic translation of spoken language may permit erroneous, or at times, nonsensical words or phrases to be inadvertently transcribed; Although I have reviewed the note for such errors, some may still exist.     NPI #: [id number]    Amanda Wang, DO  Sleep Medicine  [facility]  07/22/24

## 2024-10-18 ENCOUNTER — TRANSCRIBE ORDERS (OUTPATIENT)
Dept: PHYSICAL THERAPY | Facility: CLINIC | Age: 64
End: 2024-10-18
Payer: MEDICARE

## 2024-10-18 DIAGNOSIS — M25.552 PAIN IN LEFT HIP: Primary | ICD-10-CM

## 2024-10-22 ENCOUNTER — TREATMENT (OUTPATIENT)
Dept: PHYSICAL THERAPY | Facility: CLINIC | Age: 64
End: 2024-10-22
Payer: MEDICARE

## 2024-10-22 DIAGNOSIS — G89.29 CHRONIC LOW BACK PAIN, UNSPECIFIED BACK PAIN LATERALITY, UNSPECIFIED WHETHER SCIATICA PRESENT: Primary | ICD-10-CM

## 2024-10-22 DIAGNOSIS — M54.50 CHRONIC LOW BACK PAIN, UNSPECIFIED BACK PAIN LATERALITY, UNSPECIFIED WHETHER SCIATICA PRESENT: Primary | ICD-10-CM

## 2024-10-22 DIAGNOSIS — S39.012D STRAIN OF LUMBAR REGION, SUBSEQUENT ENCOUNTER: ICD-10-CM

## 2024-10-22 DIAGNOSIS — R29.898 WEAKNESS OF BOTH LOWER EXTREMITIES: ICD-10-CM

## 2024-10-22 NOTE — PROGRESS NOTES
Physical Therapy Initial Evaluation and Plan of Care  [address] [place] [id number]    Patient: Rocío José   : 1960  Diagnosis/ICD-10 Code:  Chronic low back pain, unspecified back pain laterality, unspecified whether sciatica present [M54.50, G89.29]  Referring practitioner: River Persaud MD  Date of Initial Visit: 10/22/2024  Today's Date: 10/22/2024  Patient seen for 1 sessions           Subjective Questionnaire: Oswestry: 10/45      Subjective Evaluation    History of Present Illness  Mechanism of injury: Pt presents to PT with reports of low back pain. Pt denies any pain down B LE's. Pt reports approximately three weeks ago she was loading a bunch of items onto a truck. Pt reports she then came back home and reports she had significant pain in low back. Pt reports she went to urgent care and had a Toradol injection which did aide with the pain. Pt reports since injection she is about 80% better. Pt did have an x-ray revealing:     Mild anterior wedging deformity of the superior endplate of L4 is of indeterminate age. Vertebral body heights are well-maintained. No subluxations are seen. Facet arthropathy is seen throughout the lumbar spine, most severe at L4-5 and L5-S1. No lytic or sclerotic bone lesions are seen.    Pt does report prolonged standing and transition from sit <> stand increases her pain.             Patient Occupation: Physical therapist assistant Pain  Current pain ratin  At best pain ratin  At worst pain ratin  Quality: discomfort  Aggravating factors: movement, ambulation and standing    Patient Goals  Patient goals for therapy: increased strength, independence with ADLs/IADLs, decreased pain and increased motion             Objective          Palpation   Left   Tenderness of the erector spinae and quadratus lumborum.     Right Tenderness of the erector spinae and quadratus lumborum.     Additional Palpation Details  Greatest increase in tightness  on R side, but greater increase in pain on L side.     Tenderness     Additional Tenderness Details  Did not perform PA's to lumbar spine due to wedge deformity - performed PA's to thoracic spine and patient does have hypomobility in thoracic spine.    Neurological Testing     Sensation     Lumbar   Left   Intact: light touch    Right   Intact: light touch    Active Range of Motion     Lumbar   Flexion: WFL  Extension: WFL  Left lateral flexion: WFL  Right lateral flexion: WFL  Left rotation: WFL  Right rotation: WFL    Strength/Myotome Testing     Left Hip   Planes of Motion   Flexion: 4-  Extension: 4-  Abduction: 4-    Right Hip   Planes of Motion   Flexion: 4  Extension: 4  Abduction: 4    Left Knee   Flexion: 5  Extension: 5    Right Knee   Flexion: 5  Extension: 5    Tests     Lumbar     Left   Negative crossed SLR, passive SLR and quadrant.     Right   Negative crossed SLR, passive SLR and quadrant.     Additional Tests Details  Negative JASON and FADIR bilaterally     Lumbar Flexibility Comments:   Slight decrease in flexibility in B piriformis, WFL B hamstrings         See Exercise, Manual, and Modality Logs for complete treatment.       Assessment & Plan       Assessment  Impairments: abnormal or restricted ROM, activity intolerance, impaired physical strength, lacks appropriate home exercise program, pain with function and weight-bearing intolerance   Functional limitations: lifting, walking, uncomfortable because of pain and standing   Assessment details: Pt presents to PT with reports of low back pain with signs and symptoms consistent with lumbar strain. Pt does have decrease in strength bilaterally, increase in pain with palpation, increase in tightness in lumbar paraspinals, and decrease in functional mobility. Pt requires skilled PT in order to address deficits listed to return patient back to maximum function such as working with no increase in pain. Initiated patient's HEP and patient tolerated  well. Will continue to progress patient as able.   Prognosis: good    Goals  Plan Goals: LUMBAR PROBLEMS:    1. The patient has complaints of pain.    LTG 1: 12 weeks: The patient will report lower back pain no greater than 1/10 in order to improve tolerance with activities of daily living and improve sleep quality.    STATUS: New    STG 1a: 6 weeks: The patient will report lower back pain no greater than 3/10.     STATUS: New      2. The patient demonstrates weakness of the bilateral hip.    LTG 2: 12 weeks: The patient will demonstrate 5/5 strength for bilateral hip flexion, abduction, and extension in order to improve hip stability.    STATUS: New      STG 2: 6 weeks: The patient will demonstrate 4/5 strength for bilateral hip flexion, abduction, and extension.    STATUS: New      Mobility: Walking/Moving Around Functional Limitation    LTG 3: 12 weeks: The patient will demonstrate 1-19% limitation by achieving a score of 3 on the Oswestry Disability Questionnaire.    STATUS: New    STG 3a: 6 weeks: The patient will demonstrate 1-19% limitation by achieving a score of 6 on the Oswestry Disability Quiestionnaire.    STATUS: New      Plan  Therapy options: will be seen for skilled therapy services  Planned modality interventions: cryotherapy, TENS, dry needling, electrical stimulation/Russian stimulation, traction and thermotherapy (hydrocollator packs)  Planned therapy interventions: manual therapy, neuromuscular re-education, ADL retraining, balance/weight-bearing training, flexibility, functional ROM exercises, gait training, home exercise program, joint mobilization, soft tissue mobilization, strengthening, stretching, therapeutic activities, abdominal trunk stabilization, postural training and spinal/joint mobilization  Frequency: 2x week  Duration in weeks: 12  Treatment plan discussed with: patient        History # of Personal Factors and/or Comorbidities: LOW (0)  Examination of Body System(s): # of  elements: LOW (1-2)  Clinical Presentation: STABLE   Clinical Decision Making: LOW       Timed:         Manual Therapy:    0     mins  11063;     Therapeutic Exercise:    8     mins  27495;     Neuromuscular Magdalene:    0    mins  86307;    Therapeutic Activity:     0     mins  70149;     Gait Trainin     mins  74040;     Ultrasound:     0     mins  92116;    Ionto                               0    mins   45237  Self pay                         0     mins [email or web address]    Un-Timed:  Electrical Stimulation:    0     mins  72670 (MC )  Traction     0     mins 85224  Low Eval     30     Mins  68677  Mod Eval     0     Mins  01668  High Eval                       0     Mins  00695  Self Pay Eval                 0     PTSP1   Re-Eval                           0    mins  71390        Timed Treatment:   8   mins   Total Treatment:     38   mins    PT SIGNATURE: Electronically signed by Celio Dye PT  [id number]    DATE TREATMENT INITIATED: 10/22/2024    Initial Certification  Certification Period: 10/22/2024 thru 2025  I certify that the therapy services are furnished while this patient is under my care.  The services outlined above are required by this patient, and will be reviewed every 90 days.     PHYSICIAN: River Persaud MD   NPI: [id number]      DATE:     Please sign and return via fax to [phone] Thank you, [facility].

## 2024-10-25 ENCOUNTER — TREATMENT (OUTPATIENT)
Dept: PHYSICAL THERAPY | Facility: CLINIC | Age: 64
End: 2024-10-25
Payer: MEDICARE

## 2024-10-25 DIAGNOSIS — S39.012D STRAIN OF LUMBAR REGION, SUBSEQUENT ENCOUNTER: ICD-10-CM

## 2024-10-25 DIAGNOSIS — R29.898 WEAKNESS OF BOTH LOWER EXTREMITIES: ICD-10-CM

## 2024-10-25 DIAGNOSIS — G89.29 CHRONIC LOW BACK PAIN, UNSPECIFIED BACK PAIN LATERALITY, UNSPECIFIED WHETHER SCIATICA PRESENT: Primary | ICD-10-CM

## 2024-10-25 DIAGNOSIS — M54.50 CHRONIC LOW BACK PAIN, UNSPECIFIED BACK PAIN LATERALITY, UNSPECIFIED WHETHER SCIATICA PRESENT: Primary | ICD-10-CM

## 2024-10-27 NOTE — PROGRESS NOTES
Physical Therapy Daily Note  [address] [place] [address]    VISIT#: 2    Subjective   Rocío José reports she has been compliant with HEP.       Objective     See Exercise, Manual, and Modality Logs for complete treatment.     Assessment/Plan    Focused all on manual therapy today, patient does have increase in tightness in B lumbar paraspinals and in posterior hip. Pt did report having soreness after manual therapy. Will continue to progress patient as able.       Progress per Plan of Care and Progress strengthening /stabilization /functional activity            Timed:                 Manual Therapy:    30     mins  70968;     Therapeutic Exercise:    0     mins  00769;     Neuromuscular Magdalene:    0    mins  87513;    Therapeutic Activity:     0     mins  09821;     Gait Trainin     mins  83568;     Ultrasound:     0     mins  78934;    Ionto                               0    mins   02439  Self pay                         0     mins [email or web address]    Un-Timed:  Electrical Stimulation:    0     mins  74014 ( )  Canalith Repos    0     mins 79268  Dry Needling     0     mins self-pay  Traction     0     mins 25325    Timed Treatment:   30   mins   Total Treatment:     30   mins    Celio Dye PT  Physical Therapist    PT SIGNATURE: Electronically signed by Celio Dye PT  [place] LICENSE: [id number]

## 2024-10-28 ENCOUNTER — TREATMENT (OUTPATIENT)
Dept: PHYSICAL THERAPY | Facility: CLINIC | Age: 64
End: 2024-10-28
Payer: MEDICARE

## 2024-10-28 DIAGNOSIS — G89.29 CHRONIC LOW BACK PAIN, UNSPECIFIED BACK PAIN LATERALITY, UNSPECIFIED WHETHER SCIATICA PRESENT: Primary | ICD-10-CM

## 2024-10-28 DIAGNOSIS — M54.50 CHRONIC LOW BACK PAIN, UNSPECIFIED BACK PAIN LATERALITY, UNSPECIFIED WHETHER SCIATICA PRESENT: Primary | ICD-10-CM

## 2024-10-28 DIAGNOSIS — S39.012D STRAIN OF LUMBAR REGION, SUBSEQUENT ENCOUNTER: ICD-10-CM

## 2024-10-28 DIAGNOSIS — R29.898 WEAKNESS OF BOTH LOWER EXTREMITIES: ICD-10-CM

## 2024-10-28 NOTE — PROGRESS NOTES
Physical Therapy Daily Note  [address] [place] [address]    VISIT#: 3    Subjective   Rocío José reports she was slightly sore after last therapy session. Pt reports her pain is improving with night time and rolling over in bed. Pt reports she does still have pain in prolonged positions.       Objective     See Exercise, Manual, and Modality Logs for complete treatment.     Assessment/Plan    Continued with manual therapy today and patient tolerated well. Pt did have improvement in mobility to thoracic spine today compared to previous session, but does continue to have tightness and pain in lumbar paraspinals. Will continue to progress patient as able.     Progress per Plan of Care and Progress strengthening /stabilization /functional activity            Timed:                 Manual Therapy:    39     mins  32751;     Therapeutic Exercise:    0     mins  62628;     Neuromuscular Magdalene:    0    mins  90695;    Therapeutic Activity:     0     mins  79195;     Gait Trainin     mins  93278;     Ultrasound:     0     mins  30010;    Ionto                               0    mins   09103  Self pay                         0     mins [email or web address]    Un-Timed:  Electrical Stimulation:    0     mins  65849 ( )  Canalith Repos    0     mins 84682  Dry Needling     0     mins self-pay  Traction     0     mins 04133    Timed Treatment:   39   mins   Total Treatment:     39   mins    Celio Dye PT  Physical Therapist    PT SIGNATURE: Electronically signed by Celio Dye PT  [place] LICENSE: [id number]

## 2024-11-01 ENCOUNTER — TREATMENT (OUTPATIENT)
Dept: PHYSICAL THERAPY | Facility: CLINIC | Age: 64
End: 2024-11-01
Payer: MEDICARE

## 2024-11-01 DIAGNOSIS — G89.29 CHRONIC LOW BACK PAIN, UNSPECIFIED BACK PAIN LATERALITY, UNSPECIFIED WHETHER SCIATICA PRESENT: Primary | ICD-10-CM

## 2024-11-01 DIAGNOSIS — R29.898 WEAKNESS OF BOTH LOWER EXTREMITIES: ICD-10-CM

## 2024-11-01 DIAGNOSIS — M54.50 CHRONIC LOW BACK PAIN, UNSPECIFIED BACK PAIN LATERALITY, UNSPECIFIED WHETHER SCIATICA PRESENT: Primary | ICD-10-CM

## 2024-11-01 DIAGNOSIS — S39.012D STRAIN OF LUMBAR REGION, SUBSEQUENT ENCOUNTER: ICD-10-CM

## 2024-11-01 NOTE — PROGRESS NOTES
Physical Therapy Daily Note  [address] [place] [address]    VISIT#: 4    Subjective   Rocío José reports she is feeling better today, but reports she had a really hard day yesterday with the pain.       Objective     See Exercise, Manual, and Modality Logs for complete treatment.     Assessment/Plan    Continued with patient's manual therapy and patient tolerated well, but did have increase in tightness in R side that did improve with continued manual therapy. Pt did have slight increase in pain with the transition from supine to sit but then improved. Will continue to progress patient as able.     Progress per Plan of Care and Progress strengthening /stabilization /functional activity            Timed:                 Manual Therapy:    30     mins  52321;     Therapeutic Exercise:    0     mins  62543;     Neuromuscular Magdalene:    0    mins  84551;    Therapeutic Activity:     0     mins  18567;     Gait Trainin     mins  91663;     Ultrasound:     0     mins  46656;    Ionto                               0    mins   88225  Self pay                         0     mins [email or web address]    Un-Timed:  Electrical Stimulation:    0     mins  91151 ( )  Canalith Repos    0     mins 17823  Dry Needling     0     mins self-pay  Traction     0     mins 98107    Timed Treatment:   30   mins   Total Treatment:     30   mins    Celio Dye PT  Physical Therapist    PT SIGNATURE: Electronically signed by Celio Dye PT  [place] LICENSE: [id number]

## 2024-11-05 ENCOUNTER — TREATMENT (OUTPATIENT)
Dept: PHYSICAL THERAPY | Facility: CLINIC | Age: 64
End: 2024-11-05
Payer: MEDICARE

## 2024-11-05 DIAGNOSIS — M54.50 CHRONIC LOW BACK PAIN, UNSPECIFIED BACK PAIN LATERALITY, UNSPECIFIED WHETHER SCIATICA PRESENT: Primary | ICD-10-CM

## 2024-11-05 DIAGNOSIS — R29.898 WEAKNESS OF BOTH LOWER EXTREMITIES: ICD-10-CM

## 2024-11-05 DIAGNOSIS — G89.29 CHRONIC LOW BACK PAIN, UNSPECIFIED BACK PAIN LATERALITY, UNSPECIFIED WHETHER SCIATICA PRESENT: Primary | ICD-10-CM

## 2024-11-05 DIAGNOSIS — S39.012D STRAIN OF LUMBAR REGION, SUBSEQUENT ENCOUNTER: ICD-10-CM

## 2024-11-05 NOTE — PROGRESS NOTES
Physical Therapy Daily Note  [address] [place] [address]    VISIT#: 5    Subjective   Rocío José reports she had a really bad day yesterday with back pain, but reports she is feeling better today. Pt reports that she did help with cleaning someone's house this weekend and moved things around and did a lot of sweeping which could have increased pain.       Objective     See Exercise, Manual, and Modality Logs for complete treatment.     Assessment/Plan    Continued with manual therapy today and performed cupping and patient reports increase in pain in lateral lumbar paraspinals with cupping that did improve with continued cupping. Discussed with patient to add standing hip extension and standing shoulder extension with march to HEP. Will continue to progress patient as able.       Progress per Plan of Care and Progress strengthening /stabilization /functional activity            Timed:                 Manual Therapy:    25     mins  00085;     Therapeutic Exercise:    0     mins  36709;     Neuromuscular Magdalene:    0    mins  74078;    Therapeutic Activity:     0     mins  72977;     Gait Trainin     mins  89328;     Ultrasound:     0     mins  74953;    Ionto                               0    mins   82514  Self pay                         0     mins [email or web address]    Un-Timed:  Electrical Stimulation:    0     mins  94313 ( )  Canalith Repos    0     mins 92212  Dry Needling     0     mins self-pay  Traction     0     mins 86896    Timed Treatment:   25   mins   Total Treatment:     25   mins    Celio Dye PT  Physical Therapist    PT SIGNATURE: Electronically signed by Celio Dye PT  [place] LICENSE: [id number]

## 2024-11-08 ENCOUNTER — TREATMENT (OUTPATIENT)
Dept: PHYSICAL THERAPY | Facility: CLINIC | Age: 64
End: 2024-11-08
Payer: MEDICARE

## 2024-11-08 DIAGNOSIS — G89.29 CHRONIC LOW BACK PAIN, UNSPECIFIED BACK PAIN LATERALITY, UNSPECIFIED WHETHER SCIATICA PRESENT: Primary | ICD-10-CM

## 2024-11-08 DIAGNOSIS — R29.898 WEAKNESS OF BOTH LOWER EXTREMITIES: ICD-10-CM

## 2024-11-08 DIAGNOSIS — M54.50 CHRONIC LOW BACK PAIN, UNSPECIFIED BACK PAIN LATERALITY, UNSPECIFIED WHETHER SCIATICA PRESENT: Primary | ICD-10-CM

## 2024-11-08 DIAGNOSIS — S39.012D STRAIN OF LUMBAR REGION, SUBSEQUENT ENCOUNTER: ICD-10-CM

## 2024-11-08 NOTE — PROGRESS NOTES
Physical Therapy Daily Note  [address] [place] [address]    VISIT#: 6    Subjective   Rocoí José reports she felt good after last therapy session, but reports she had increase in pain yesterday and had to keep changing positions while at work to find a comfortable position. Pt reports she has been compliant with HEP.       Objective     See Exercise, Manual, and Modality Logs for complete treatment.     Assessment/Plan    Continued with cupping and patient does report relief at end of cupping. Pt does have the most increase in pain on lateral paraspinals. Pt is having improvement with mobility of spine with mobilizations. Will continue to progress patient as able.     Progress per Plan of Care and Progress strengthening /stabilization /functional activity            Timed:                 Manual Therapy:    25     mins  92613;     Therapeutic Exercise:    0     mins  38284;     Neuromuscular Magdalene:    0    mins  44761;    Therapeutic Activity:     0     mins  37829;     Gait Trainin     mins  24498;     Ultrasound:     0     mins  89152;    Ionto                               0    mins   93672  Self pay                         0     mins [email or web address]    Un-Timed:  Electrical Stimulation:    0     mins  33494 ( )  Canalith Repos    0     mins 48560  Dry Needling     0     mins self-pay  Traction     0     mins 70186    Timed Treatment:   25   mins   Total Treatment:     25   mins    Celio Dye PT  Physical Therapist    PT SIGNATURE: Electronically signed by Celio Dye PT  [place] LICENSE: [id number]

## 2024-11-12 ENCOUNTER — TREATMENT (OUTPATIENT)
Dept: PHYSICAL THERAPY | Facility: CLINIC | Age: 64
End: 2024-11-12
Payer: MEDICARE

## 2024-11-12 DIAGNOSIS — S39.012D STRAIN OF LUMBAR REGION, SUBSEQUENT ENCOUNTER: ICD-10-CM

## 2024-11-12 DIAGNOSIS — R29.898 WEAKNESS OF BOTH LOWER EXTREMITIES: ICD-10-CM

## 2024-11-12 DIAGNOSIS — G89.29 CHRONIC LOW BACK PAIN, UNSPECIFIED BACK PAIN LATERALITY, UNSPECIFIED WHETHER SCIATICA PRESENT: Primary | ICD-10-CM

## 2024-11-12 DIAGNOSIS — M54.50 CHRONIC LOW BACK PAIN, UNSPECIFIED BACK PAIN LATERALITY, UNSPECIFIED WHETHER SCIATICA PRESENT: Primary | ICD-10-CM

## 2024-11-12 NOTE — PROGRESS NOTES
Physical Therapy Daily Note  [address] [place] [address]    VISIT#: 7    Subjective   Rocío José reports she does feel like her back pain has improved. Pt reports that she notices on days where she stands more throughout the day she has more increase in pain. Pt reports she didn't lift anything heavy this weekend which also helped.       Objective     See Exercise, Manual, and Modality Logs for complete treatment.     Assessment/Plan    Continued with manual therapy and patient did have increase in tightness in L paraspinals compared to R paraspinals. Pt did have a decrease in tightness and a relief of pain with continued manual therapy. Will continue to progress patient as able.       Progress per Plan of Care and Progress strengthening /stabilization /functional activity            Timed:                 Manual Therapy:    25     mins  66305;     Therapeutic Exercise:    0     mins  69426;     Neuromuscular Magdalene:    0    mins  21959;    Therapeutic Activity:     0     mins  72368;     Gait Trainin     mins  50804;     Ultrasound:     0     mins  90098;    Ionto                               0    mins   10687  Self pay                         0     mins [email or web address]    Un-Timed:  Electrical Stimulation:    0     mins  43430 ( )  Canalith Repos    0     mins 66022  Dry Needling     0     mins self-pay  Traction     0     mins 48065    Timed Treatment:   25   mins   Total Treatment:     25   mins    Celio Dye PT  Physical Therapist    PT SIGNATURE: Electronically signed by Celio Dye PT  [place] LICENSE: [id number]

## 2024-11-15 ENCOUNTER — TREATMENT (OUTPATIENT)
Dept: PHYSICAL THERAPY | Facility: CLINIC | Age: 64
End: 2024-11-15
Payer: MEDICARE

## 2024-11-15 DIAGNOSIS — S39.012D STRAIN OF LUMBAR REGION, SUBSEQUENT ENCOUNTER: ICD-10-CM

## 2024-11-15 DIAGNOSIS — R29.898 WEAKNESS OF BOTH LOWER EXTREMITIES: ICD-10-CM

## 2024-11-15 DIAGNOSIS — G89.29 CHRONIC LOW BACK PAIN, UNSPECIFIED BACK PAIN LATERALITY, UNSPECIFIED WHETHER SCIATICA PRESENT: Primary | ICD-10-CM

## 2024-11-15 DIAGNOSIS — M54.50 CHRONIC LOW BACK PAIN, UNSPECIFIED BACK PAIN LATERALITY, UNSPECIFIED WHETHER SCIATICA PRESENT: Primary | ICD-10-CM

## 2024-11-15 NOTE — PROGRESS NOTES
Physical Therapy Daily Note  [address] [place] [address]    VISIT#: 8    Subjective   Rocío José reports she continues to have low back pain and requires transition to sitting and standing frequently throughout her work day due to pain.       Objective     See Exercise, Manual, and Modality Logs for complete treatment.     Assessment/Plan    Continued with manual therapy and patient tolerated well, but did report increase in pain with mobilizations to lower spine. Pt did report relief of pain with continued manual therapy. Will continue to progress patient as able.       Progress per Plan of Care and Progress strengthening /stabilization /functional activity            Timed:                 Manual Therapy:    28     mins  66993;     Therapeutic Exercise:    0     mins  97998;     Neuromuscular Magdalene:    0    mins  17111;    Therapeutic Activity:     0     mins  32029;     Gait Trainin     mins  92401;     Ultrasound:     0     mins  08106;    Ionto                               0    mins   62345  Self pay                         0     mins [email or web address]    Un-Timed:  Electrical Stimulation:    0     mins  31864 ( )  Canalith Repos    0     mins 67725  Dry Needling     0     mins self-pay  Traction     0     mins 52144    Timed Treatment:   28   mins   Total Treatment:     28   mins    Celio Dye PT  Physical Therapist    PT SIGNATURE: Electronically signed by Celio Dye PT  [place] LICENSE: [id number]

## 2024-11-19 ENCOUNTER — TREATMENT (OUTPATIENT)
Dept: PHYSICAL THERAPY | Facility: CLINIC | Age: 64
End: 2024-11-19
Payer: MEDICARE

## 2024-11-19 DIAGNOSIS — R29.898 WEAKNESS OF BOTH LOWER EXTREMITIES: ICD-10-CM

## 2024-11-19 DIAGNOSIS — M54.50 CHRONIC LOW BACK PAIN, UNSPECIFIED BACK PAIN LATERALITY, UNSPECIFIED WHETHER SCIATICA PRESENT: Primary | ICD-10-CM

## 2024-11-19 DIAGNOSIS — G89.29 CHRONIC LOW BACK PAIN, UNSPECIFIED BACK PAIN LATERALITY, UNSPECIFIED WHETHER SCIATICA PRESENT: Primary | ICD-10-CM

## 2024-11-19 DIAGNOSIS — S39.012D STRAIN OF LUMBAR REGION, SUBSEQUENT ENCOUNTER: ICD-10-CM

## 2024-11-19 PROCEDURE — 97140 MANUAL THERAPY 1/> REGIONS: CPT | Performed by: PHYSICAL THERAPIST

## 2024-11-22 ENCOUNTER — TREATMENT (OUTPATIENT)
Dept: PHYSICAL THERAPY | Facility: CLINIC | Age: 64
End: 2024-11-22
Payer: MEDICARE

## 2024-11-22 DIAGNOSIS — S39.012D STRAIN OF LUMBAR REGION, SUBSEQUENT ENCOUNTER: ICD-10-CM

## 2024-11-22 DIAGNOSIS — M54.50 CHRONIC LOW BACK PAIN, UNSPECIFIED BACK PAIN LATERALITY, UNSPECIFIED WHETHER SCIATICA PRESENT: Primary | ICD-10-CM

## 2024-11-22 DIAGNOSIS — R29.898 WEAKNESS OF BOTH LOWER EXTREMITIES: ICD-10-CM

## 2024-11-22 DIAGNOSIS — G89.29 CHRONIC LOW BACK PAIN, UNSPECIFIED BACK PAIN LATERALITY, UNSPECIFIED WHETHER SCIATICA PRESENT: Primary | ICD-10-CM

## 2024-11-22 NOTE — PROGRESS NOTES
Progress Assessment  [address] [place] [address]        Patient: Rocío José   : 1960  Diagnosis/ICD-10 Code:  Chronic low back pain, unspecified back pain laterality, unspecified whether sciatica present [M54.50, G89.29]  Referring practitioner: River Persaud MD  Date of Initial Visit: Type: THERAPY  Noted: 10/22/2024  Today's Date: 2024  Patient seen for 10 sessions      Subjective:   Rocío José reports: 3/10  Subjective Questionnaire: Oswestry:   Clinical Progress: improved  Home Program Compliance: Yes  Treatment has included: therapeutic exercise, neuromuscular re-education, manual therapy, and therapeutic activity    Subjective     Pt reports she does feel like her back pain has improved since starting PT. 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 Pt reports she does continue to have pain, however.     Objective     Active Range of Motion      Lumbar   Flexion: WFL  Extension: WFL  Left lateral flexion: WFL  Right lateral flexion: WFL  Left rotation: WFL  Right rotation: WFL     Strength/Myotome Testing      Left Hip   Planes of Motion   Flexion: 5  Extension: 4  Abduction: 5  Adduction: 4+     Right Hip   Planes of Motion   Flexion: 5  Extension: 4  Abduction: 5  Adduction: 4+      Left Knee   Flexion: 5  Extension: 5     Right Knee   Flexion: 5  Extension: 5    Assessment/Plan    Pt has made excellent progress in improving strength since IE. Pt has also improved functional mobility as demonstrated by an improvement in TRAVIS. Pt has also overall had a decrease in pain. Pt does still require skilled PT in order to address strength and pain deficits in order to return patient back to maximum function such as working with minimal to no pain. Will continue to progress patient as able.     Goals  Plan Goals: LUMBAR PROBLEMS:     1. The patient has complaints of pain.     LTG 1: 12 weeks: The patient  will report lower back pain no greater than 1/10 in order to improve tolerance with activities of daily living and improve sleep quality.     STATUS: IN PROGRESS     STG 1a: 6 weeks: The patient will report lower back pain no greater than 3/10.      STATUS: MET        2. The patient demonstrates weakness of the bilateral hip.     LTG 2: 12 weeks: The patient will demonstrate 5/5 strength for bilateral hip flexion, abduction, and extension in order to improve hip stability.     STATUS: IN PROGRESS        STG 2: 6 weeks: The patient will demonstrate 4/5 strength for bilateral hip flexion, abduction, and extension.     STATUS: MET        Mobility: Walking/Moving Around Functional Limitation     LTG 3: 12 weeks: The patient will demonstrate 1-19% limitation by achieving a score of 3 on the Oswestry Disability Questionnaire.     STATUS: IN PROGRESS     STG 3a: 6 weeks: The patient will demonstrate 1-19% limitation by achieving a score of 6 on the Oswestry Disability Quiestionnaire.     STATUS: IN PROGRESS    Progress toward previous goals: Partially Met    See Exercise, Manual, and Modality Logs for complete treatment.         Recommendations: Continue as planned  Timeframe: 2x a week for 1 month  Prognosis to achieve goals: good    PT SIGNATURE: Electronically signed by Celio Dye, SANGITA  [place] LICENSE: [id number]    Based upon review of the patient's progress and continued therapy plan, it is my medical opinion that Rocío José should continue physical therapy treatment at [facility] PHYSICAL THERAPY  [address] [facility]  [place] [address] [phone].      Timed:                 Manual Therapy:    20     mins  08288;     Therapeutic Exercise:    10     mins  86734;     Neuromuscular Magdalene:    0    mins  26558;    Therapeutic Activity:     0     mins  02597;     Gait Trainin     mins  05647;     Ultrasound:     0     mins  92728;    Ionto                               0    mins    69165  Self pay                         0     mins [email or web address]    Un-Timed:  Electrical Stimulation:    0     mins  96480 ( )  Canalith Repos    0     mins 01457  Dry Needling     0     mins self-pay  Traction     0     mins 95928    Timed Treatment:   30   mins   Total Treatment:     30   mins      I certify that the therapy services are furnished while this patient is under my care.  The services outlined above are required by this patient, and will be reviewed every 90 days.

## 2024-11-26 ENCOUNTER — TREATMENT (OUTPATIENT)
Dept: PHYSICAL THERAPY | Facility: CLINIC | Age: 64
End: 2024-11-26
Payer: MEDICARE

## 2024-11-26 DIAGNOSIS — R29.898 WEAKNESS OF BOTH LOWER EXTREMITIES: ICD-10-CM

## 2024-11-26 DIAGNOSIS — S39.012D STRAIN OF LUMBAR REGION, SUBSEQUENT ENCOUNTER: ICD-10-CM

## 2024-11-26 DIAGNOSIS — G89.29 CHRONIC LOW BACK PAIN, UNSPECIFIED BACK PAIN LATERALITY, UNSPECIFIED WHETHER SCIATICA PRESENT: Primary | ICD-10-CM

## 2024-11-26 DIAGNOSIS — M54.50 CHRONIC LOW BACK PAIN, UNSPECIFIED BACK PAIN LATERALITY, UNSPECIFIED WHETHER SCIATICA PRESENT: Primary | ICD-10-CM

## 2024-11-26 NOTE — PROGRESS NOTES
Physical Therapy Daily Note  [address] [place] [address]    VISIT#: 11    Subjective   Rocío José reports 4/10 pain today in low back. Pt reports she had a good day at work today. 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       Objective     See Exercise, Manual, and Modality Logs for complete treatment.     Assessment/Plan    Continued with manual therapy and patient tolerated well. Pt did have increase in tightness on L side compared to R side. Pt reports relief of pain with manual therapy. Discussed with patient to continue with exercises at home. Will continue to progress patient as able.       Progress per Plan of Care and Progress strengthening /stabilization /functional activity            Timed:                 Manual Therapy:    28     mins  81303;     Therapeutic Exercise:    0     mins  28808;     Neuromuscular Magdalene:    0    mins  81948;    Therapeutic Activity:     0     mins  19394;     Gait Trainin     mins  09139;     Ultrasound:     0     mins  69763;    Ionto                               0    mins   37979  Self pay                         0     mins [email or web address]    Un-Timed:  Electrical Stimulation:    0     mins  40384 ( )  Canalith Repos    0     mins 98954  Dry Needling     0     mins self-pay  Traction     0     mins 44573    Timed Treatment:   28   mins   Total Treatment:     28   mins    Celio Dye PT  Physical Therapist    PT SIGNATURE: Electronically signed by Celio Dye PT  [place] LICENSE: [id number]

## 2024-12-16 ENCOUNTER — TREATMENT (OUTPATIENT)
Dept: PHYSICAL THERAPY | Facility: CLINIC | Age: 64
End: 2024-12-16
Payer: MEDICARE

## 2024-12-16 DIAGNOSIS — G89.29 CHRONIC LOW BACK PAIN, UNSPECIFIED BACK PAIN LATERALITY, UNSPECIFIED WHETHER SCIATICA PRESENT: Primary | ICD-10-CM

## 2024-12-16 DIAGNOSIS — R29.898 WEAKNESS OF BOTH LOWER EXTREMITIES: ICD-10-CM

## 2024-12-16 DIAGNOSIS — M54.50 CHRONIC LOW BACK PAIN, UNSPECIFIED BACK PAIN LATERALITY, UNSPECIFIED WHETHER SCIATICA PRESENT: Primary | ICD-10-CM

## 2024-12-16 DIAGNOSIS — S39.012D STRAIN OF LUMBAR REGION, SUBSEQUENT ENCOUNTER: ICD-10-CM

## 2024-12-16 PROCEDURE — 97140 MANUAL THERAPY 1/> REGIONS: CPT | Performed by: PHYSICAL THERAPIST

## 2024-12-17 NOTE — PROGRESS NOTES
Physical Therapy Daily Note  [address] [place] [address]    VISIT#: 12    Subjective   Rocío José reports that her back pain has improved since last seen by PT. Pt reports that she does still have days with significant increase in pain, but is having more good days.       Objective     See Exercise, Manual, and Modality Logs for complete treatment.     Assessment/Plan    Continued with manual therapy and patient did report increase in pain with lying in prone, but did report relief of pain with manual therapy. Will continue to progress patient as able.       Progress per Plan of Care and Progress strengthening /stabilization /functional activity            Timed:                 Manual Therapy:    28     mins  97990;     Therapeutic Exercise:    0     mins  08450;     Neuromuscular Magdalene:    0    mins  35328;    Therapeutic Activity:     0     mins  74251;     Gait Trainin     mins  95423;     Ultrasound:     0     mins  25347;    Ionto                               0    mins   91977  Self pay                         0     mins [email or web address]    Un-Timed:  Electrical Stimulation:    0     mins  21468 ( )  Canalith Repos    0     mins 72152  Dry Needling     0     mins self-pay  Traction     0     mins 33216    Timed Treatment:   28   mins   Total Treatment:     28   mins    Celio Dye PT  Physical Therapist    PT SIGNATURE: Electronically signed by Celio Dye PT  [place] LICENSE: [id number]

## 2025-01-14 ENCOUNTER — TREATMENT (OUTPATIENT)
Dept: PHYSICAL THERAPY | Facility: CLINIC | Age: 65
End: 2025-01-14
Payer: MEDICARE

## 2025-01-14 DIAGNOSIS — M54.50 CHRONIC LOW BACK PAIN, UNSPECIFIED BACK PAIN LATERALITY, UNSPECIFIED WHETHER SCIATICA PRESENT: Primary | ICD-10-CM

## 2025-01-14 DIAGNOSIS — R29.898 WEAKNESS OF BOTH LOWER EXTREMITIES: ICD-10-CM

## 2025-01-14 DIAGNOSIS — G89.29 CHRONIC LOW BACK PAIN, UNSPECIFIED BACK PAIN LATERALITY, UNSPECIFIED WHETHER SCIATICA PRESENT: Primary | ICD-10-CM

## 2025-01-14 DIAGNOSIS — S39.012D STRAIN OF LUMBAR REGION, SUBSEQUENT ENCOUNTER: ICD-10-CM

## 2025-01-14 NOTE — PROGRESS NOTES
Progress Assessment  [address] [facility] [address] [place] [address]        Patient: Rocío José   : 1960  Diagnosis/ICD-10 Code:  Chronic low back pain, unspecified back pain laterality, unspecified whether sciatica present [M54.50, G89.29]  Referring practitioner: River Persaud MD  Date of Initial Visit: Type: THERAPY  Noted: 10/22/2024  Today's Date: 2025  Patient seen for 13 sessions      Subjective:   Rocío José reports: 4/10  Subjective Questionnaire: Oswestry: 10/45  Clinical Progress: improved  Home Program Compliance: Yes  Treatment has included: therapeutic exercise, neuromuscular re-education, manual therapy, and therapeutic activity    Subjective     Pt reports that she is about 80% better since starting PT. Pt reports that she has been compliant with HEP. Pt reports that she will still get occasional pain in back and hips, but reports that overall it is better.     Objective     Strength/Myotome Testing      Left Hip   Planes of Motion   Flexion: 5  Extension: 5  Abduction: 5  Adduction: 5     Right Hip   Planes of Motion   Flexion: 5  Extension: 5  Abduction: 5  Adduction: 5     Left Knee   Flexion: 5  Extension: 5     Right Knee   Flexion: 5  Extension: 5    Assessment/Plan    Pt has made excellent progress with improving strength since last progress note. Pt has met all strength goals at this time. Discussed with patient about transitioning patient to HEP and patient agrees with plan. Discussed with patient to perform shoulder extension with march, palof press, standing side bends, bird dogs, and dead bugs for HEP. Discussed with patient that if she has increase in pain over the next 1-2 months in back to call and will schedule patient another appointment. If haven't heard from patient in a few months, will discharge patient.     Goals  Plan Goals: LUMBAR PROBLEMS:     1. The patient has complaints of pain.     LTG 1: 12 weeks: The patient will report lower back pain no  greater than 1/10 in order to improve tolerance with activities of daily living and improve sleep quality.     STATUS: IN PROGRESS     STG 1a: 6 weeks: The patient will report lower back pain no greater than 3/10.      STATUS: IN PROGRESS        2. The patient demonstrates weakness of the bilateral hip.     LTG 2: 12 weeks: The patient will demonstrate 5/5 strength for bilateral hip flexion, abduction, and extension in order to improve hip stability.     STATUS: MET        STG 2: 6 weeks: The patient will demonstrate 4/5 strength for bilateral hip flexion, abduction, and extension.     STATUS: MET        Mobility: Walking/Moving Around Functional Limitation     LTG 3: 12 weeks: The patient will demonstrate 1-19% limitation by achieving a score of 3 on the Oswestry Disability Questionnaire.     STATUS: IN PROGRESS     STG 3a: 6 weeks: The patient will demonstrate 1-19% limitation by achieving a score of 6 on the Oswestry Disability Quiestionnaire.     STATUS: IN PROGRESS    Progress toward previous goals: Partially Met    See Exercise, Manual, and Modality Logs for complete treatment.         Recommendations: Continue with recommendations will be transitioning patient to [facility] and if patient needs additional PT visits patient can call  Timeframe:  Dependent on need of patient  Prognosis to achieve goals: good    PT SIGNATURE: Electronically signed by Celio Dye PT  [place] LICENSE: [id number]    Based upon review of the patient's progress and continued therapy plan, it is my medical opinion that Rocío José should continue physical therapy treatment at [facility] PHYSICAL THERAPY  [address] [place] [id number] [phone].      Timed:                 Manual Therapy:    15     mins  09024;     Therapeutic Exercise:    0     mins  43074;     Neuromuscular Magdalene:    0    mins  27319;    Therapeutic Activity:     0     mins  02891;     Gait Trainin     mins  73473;      Ultrasound:     0     mins  06730;    Ionto                               0    mins   07907  Self pay                         0     mins [email or web address]    Un-Timed:  Electrical Stimulation:    0     mins  58137 ( )  Canalith Repos    0     mins 59957  Dry Needling     0     mins self-pay  Traction     0     mins 04876  Re-evaluation     10     mins 28225    Timed Treatment:   15   mins   Total Treatment:     25   mins      I certify that the therapy services are furnished while this patient is under my care.  The services outlined above are required by this patient, and will be reviewed every 90 days.

## 2025-04-23 ENCOUNTER — TREATMENT (OUTPATIENT)
Dept: PHYSICAL THERAPY | Facility: CLINIC | Age: 65
End: 2025-04-23
Payer: MEDICARE

## 2025-04-23 DIAGNOSIS — M62.9 MUSCULOSKELETAL DISORDER INVOLVING UPPER TRAPEZIUS MUSCLE: ICD-10-CM

## 2025-04-23 DIAGNOSIS — M54.2 PAIN, NECK: Primary | ICD-10-CM

## 2025-04-23 DIAGNOSIS — M54.2 NECK PAIN ON RIGHT SIDE: ICD-10-CM

## 2025-04-23 DIAGNOSIS — M53.82 MUSCULOSKELETAL DISORDER OF THE SUBOCCIPITAL: ICD-10-CM

## 2025-04-23 PROCEDURE — 97161 PT EVAL LOW COMPLEX 20 MIN: CPT

## 2025-04-23 PROCEDURE — 97140 MANUAL THERAPY 1/> REGIONS: CPT

## 2025-04-23 NOTE — PROGRESS NOTES
Physical Therapy Initial Evaluation and Plan of Care                    Sofie QUESADA [address] [place] [address]    Patient: Rocío José   : 1960  Diagnosis/ICD-10 Code:  Pain, neck [M54.2]  Referring practitioner: JANES Christine  Date of Initial Visit: 2025  Today's Date: 2025  Patient seen for 14 sessions           Subjective Questionnaire: NDI:11    (22% disability)      Subjective Evaluation    History of Present Illness  Mechanism of injury: Pt presents to therapy with complaints of neck pain. Pt reports that her neck just started hurting. There was no TOMY. She feels like her neck is rotated to the R most of the time. She has pain at the R suboccipitals, R upper trapezius and down the medial side of the R shoulder blade. She sleeps on her L side. It hurts when she sleeps on her L side, but she cannot sleep on her R side. Her neck does wake her up from sleep. Pt has had her neck dry needled. It helps for a little bit and then comes back. She tries to work on it herself, but feels like she just irritates. Pt denies HA's. Tylenol helps the pain some. Usually she takes it at night if she takes it. Side bending to the L and R are pretty painful. She feels like she has decreased ROM. If she applies pressure to the R side of her neck, it allows her to SB to the R more. Pt does have some numbness/tingling in the L arm, but this is a long standing issue from about 5 years ago. Pain does not radiate down the arm at all. 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 Pt does not have any arm weakness.     MRI of cervical spine from 5 years ago revealed:   C4-5:  Prominent posterior disc osteophyte effaces the ventral thecal sac and has mass effect on the cervical cord. Moderate to moderately severe central canal narrowing.    C5-6:  Small posterior disc osteophyte.  Mild central canal narrowing.   Bilateral facet arthrosis. Mild bilateral neural foraminal narrowing.    C6-7:  Posterior disc osteophyte.  Mild central canal narrowing.  Mild neural foraminal narrowing.      PMH: HTN, hyperlipidemia, heart murmur         Patient Occupation: physical therapist assistant Pain  Current pain ratin  At best pain ratin  At worst pain ratin  Quality: discomfort and tight  Relieving factors: medications (Tylenol)  Aggravating factors: prolonged positioning and sleeping    Treatments  Previous treatment: physical therapy  Patient Goals  Patient goals for therapy: decreased pain, increased motion, return to sport/leisure activities, independence with ADLs/IADLs and increased strength             Objective          Static Posture     Head  Forward.    Shoulders  Rounded.    Palpation   Left   Tenderness of the levator scapulae, middle trapezius, pectoralis major, pectoralis minor, rhomboids, suboccipitals and upper trapezius.     Right   No palpable tenderness to the upper trapezius. Tenderness of the cervical paraspinals, levator scapulae, middle trapezius, pectoralis major, pectoralis minor, rhomboids, suboccipitals and upper trapezius.     Active Range of Motion   Cervical/Thoracic Spine   Cervical    Flexion: 36 degrees   Extension: 25 degrees   Left lateral flexion: 35 degrees   Right lateral flexion: 36 degrees   Left rotation: 50 degrees   Right rotation: 58 degrees   Left Shoulder Flexibility Comments:   Tight pectorals  Right Shoulder Flexibility Comments:   Tight pectorals      See Exercise, Manual, and Modality Logs for complete treatment.     Assessment & Plan       Assessment  Impairments: abnormal muscle firing, abnormal muscle tone, abnormal or restricted ROM, activity intolerance, impaired physical strength, lacks appropriate home exercise program, pain with function and safety issue   Functional limitations: carrying objects, lifting, pulling, pushing, uncomfortable because of pain and unable to  perform repetitive tasks   Assessment details: Pt presents to therapy with complaints of R side neck pain that spans from the R shoulder to the R side of the occiput. Symptoms and results of objective testing are consistent with muscle tightness of the R upper trapezius, R suboccipitals and R medial scapular musculature, likely caused by repetitive/consistent posturing. Pt has associated decreased neck AROM, pain to palpation, difficulty sleeping and other functional deficits (NDI). Skilled therapy is required in order to address the aforementioned impairments so that she has increased tolerance to ADL's, such as sleeping through the night undisturbed.   Prognosis: good    Goals  Plan Goals: CERVICAL PROBLEMS    1. The patient has limited ROM.    LTG 1: 12 weeks:  The patient will demonstrate 60° of B cervical spine rotation, 45° of B cervical side bending, 45° of cervical flexion, and 35° of cervical extension in order to adequately monitor blind spots while driving and improve ability to perform activities of daily living.    STATUS:  New  STG 1a: 6 weeks:  The patient will demonstrate 56° of L cervical spine rotation, 40° of B cervical side bending, 40° of cervical flexion, and 30° of cervical extension in order to adequately monitor blind spots while driving and improve ability to perform activities of daily living.       STATUS:  New     2. The patient has complaints of pain.   LTG 2: 12 weeks:  The patient will report 1/10 pain or better, on average in the past week, in order to more easily tolerate activities of daily living and improve sleep quality.    STATUS:  New   STG 2a: 6 weeks:  The patient will report 3/10 pain or better, on average in the past week.    STATUS:  New    3. The patient reports symptoms of her neck that are currently limiting her functional abilities.    LTG 3: 12 weeks:  The patient will report  70% improvement in her neck pain since starting therapy, in order to allow her increased  tolerance to ADL's, such as checking her blind spots while driving and sleeping through the night.     STATUS:  New   STG 3a: 6 weeks:   The patient will report  20% improvement in her neck pain since starting therapy, in order to allow her increased tolerance to ADL's, such as checking her blind spots while driving and sleeping through the night.     STATUS:  New    4. Carrying, Moving, and Handling Objects Functional Limitation     LTG 4: 12 weeks:  The patient will demonstrate 8% disability or less on the Neck Disability Index, in order to allow her increased tolerance to ADL's, such as checking her blind spots while driving and sleeping through the night.     STATUS:  New   STG 4a: 6 weeks:  The patient will demonstrate 13% disability or less on the Neck Disability Index.      STATUS:  New     Plan  Therapy options: will be seen for skilled therapy services  Planned modality interventions: cryotherapy, electrical stimulation/Russian stimulation, TENS, traction, ultrasound and dry needling  Planned therapy interventions: ADL retraining, soft tissue mobilization, strengthening, stretching, therapeutic activities, joint mobilization, home exercise program, functional ROM exercises, flexibility, body mechanics training, postural training, neuromuscular re-education, manual therapy, motor coordination training, spinal/joint mobilization and IADL retraining  Frequency: 3x week  Duration in weeks: 12  Treatment plan discussed with: patient        Visit Diagnoses:    ICD-10-CM ICD-9-CM   1. Pain, neck  M54.2 723.1   2. Neck pain on right side  M54.2 723.1   3. Musculoskeletal disorder of the suboccipital  M53.82 723.9   4. Musculoskeletal disorder involving upper trapezius muscle  M62.9 723.9       History # of Personal Factors and/or Comorbidities: MODERATE (1-2)  Examination of Body System(s): # of elements: LOW (1-2)  Clinical Presentation: STABLE   Clinical Decision Making: LOW       Timed:         Manual Therapy:     23    mins  16968;     Therapeutic Exercise:    0     mins  69796;     Neuromuscular Magdalene:    0    mins  91404;    Therapeutic Activity:     0     mins  23558;     Gait Trainin     mins  43039;     Ultrasound:     0     mins  80661;    Ionto                               0    mins   83174  Self Care                       0     mins   41379  Canalith Repos    0     mins 12001      Un-Timed:  Electrical Stimulation:    0     mins  53789 (MC );  Dry Needling     0     mins self-pay  Traction     0     mins 27485  Low Eval     21     Mins  73757  Mod Eval     0     Mins  56357  High Eval                       0     Mins  92495  Re-Eval                           0    mins  35049    Timed Treatment:   23   mins   Total Treatment:     44   mins    PT SIGNATURE: Elsa Torre PT    Electronically signed 2025    [place] License: PT - [id number]      Initial Certification  Certification Period: 2025 thru 2025  I certify that the therapy services are furnished while this patient is under my care.  The services outlined above are required by this patient, and will be reviewed every 90 days.     PHYSICIAN: Hyun Collado APRN  NPI: [id number]      DATE:     Please sign and return via fax to [phone]. Thank you, [facility].

## 2025-05-06 ENCOUNTER — TREATMENT (OUTPATIENT)
Dept: PHYSICAL THERAPY | Facility: CLINIC | Age: 65
End: 2025-05-06
Payer: MEDICARE

## 2025-05-06 DIAGNOSIS — M53.82 MUSCULOSKELETAL DISORDER OF THE SUBOCCIPITAL: ICD-10-CM

## 2025-05-06 DIAGNOSIS — M54.2 PAIN, NECK: Primary | ICD-10-CM

## 2025-05-06 DIAGNOSIS — M62.9 MUSCULOSKELETAL DISORDER INVOLVING UPPER TRAPEZIUS MUSCLE: ICD-10-CM

## 2025-05-06 DIAGNOSIS — M54.2 NECK PAIN ON RIGHT SIDE: ICD-10-CM

## 2025-05-06 PROCEDURE — 97140 MANUAL THERAPY 1/> REGIONS: CPT

## 2025-05-06 NOTE — PROGRESS NOTES
Physical Therapy Daily Treatment Note                      Sofie PT [address] [place] [address]    Patient: Rocío José   : 1960  Diagnosis/ICD-10 Code:  Pain, neck [M54.2]  Referring practitioner: JANES Christine  Date of Initial Visit: Type: THERAPY  Noted: 2025  Today's Date: 2025  Patient seen for 2 sessions           Subjective   The patient reported that she has a difficult time sleeping at night.     Objective   See Exercise, Manual, and Modality Logs for complete treatment.     Assessment/Plan  Pt tolerated today's session well. Pt had tenderness of the suboccipitals on the R side and tenderness to the upper trapezius on the L side. Pt had relief with STM to the neck. Skilled therapy still required in order to address the deficits outlined in the initial evaluation, as this was only the pt's first treatment session. Continue POC.       Timed:  Manual Therapy:    26     mins  39392;  Therapeutic Exercise:    0     mins  11363;     Neuromuscular Magdalene:   0    mins  80865;    Therapeutic Activity:     0     mins  02526;     Gait Trainin     mins  76998;     Aquatics                         0      mins  24326    Un-timed:  Mechanical Traction      0     mins  33127  Dry Needling     0     mins self-pay  Electrical Stimulation:    0     mins  34512 ( );      Timed Treatment:   26   mins   Total Treatment:     26   mins    Elsa Torre PT    Electronically signed 2025    [place] License: PT - [id number]

## 2025-05-08 ENCOUNTER — TREATMENT (OUTPATIENT)
Dept: PHYSICAL THERAPY | Facility: CLINIC | Age: 65
End: 2025-05-08
Payer: MEDICARE

## 2025-05-08 DIAGNOSIS — M62.9 MUSCULOSKELETAL DISORDER INVOLVING UPPER TRAPEZIUS MUSCLE: ICD-10-CM

## 2025-05-08 DIAGNOSIS — M54.2 NECK PAIN ON RIGHT SIDE: ICD-10-CM

## 2025-05-08 DIAGNOSIS — M53.82 MUSCULOSKELETAL DISORDER OF THE SUBOCCIPITAL: ICD-10-CM

## 2025-05-08 DIAGNOSIS — M54.2 PAIN, NECK: Primary | ICD-10-CM

## 2025-05-08 PROCEDURE — 97140 MANUAL THERAPY 1/> REGIONS: CPT

## 2025-05-08 NOTE — PROGRESS NOTES
Physical Therapy Daily Treatment Note                      [facility] PT [address] [facility] [place] [address]    Patient: Rocío José   : 1960  Diagnosis/ICD-10 Code:  Pain, neck [M54.2]  Referring practitioner: JANES Christine  Date of Initial Visit: Type: THERAPY  Noted: 2025  Today's Date: 2025  Patient seen for 3 sessions           Subjective   The patient reported that she slept better the night after she had manual treatments to her neck. It seems to help the pain when her  presses on her spine. Her workday was a little better yesterday.     Objective   See Exercise, Manual, and Modality Logs for complete treatment.     Assessment/Plan  Pt tolerated today's session well. Pt noticed good benefits from the last manual treatments that she had, so therapist performed a lot of the same techniques today. Therapist added thoracic spine mobilizations, as well. At end of session pt reported that she felt like all of that had needed to be done. Skilled therapy still required in order to address the deficits outlined in the initial evaluation, as this was only the pt's second treatment session. Continue POC.        Timed:  Manual Therapy:    23     mins  67838;  Therapeutic Exercise:    0     mins  60828;     Neuromuscular Magdalene:   0    mins  03043;    Therapeutic Activity:     2     mins  20681;     Gait Trainin     mins  77696;     Aquatics                         0      mins  27736    Un-timed:  Mechanical Traction      0     mins  80429  Dry Needling     0     mins self-pay  Electrical Stimulation:    0     mins  49562 ( );      Timed Treatment:   25  mins   Total Treatment:     25   mins    Elsa Torre PT    Electronically signed 2025    [place] License: PT - [id number]

## 2025-05-13 ENCOUNTER — TREATMENT (OUTPATIENT)
Dept: PHYSICAL THERAPY | Facility: CLINIC | Age: 65
End: 2025-05-13
Payer: MEDICARE

## 2025-05-13 DIAGNOSIS — M54.2 NECK PAIN ON RIGHT SIDE: ICD-10-CM

## 2025-05-13 DIAGNOSIS — M62.9 MUSCULOSKELETAL DISORDER INVOLVING UPPER TRAPEZIUS MUSCLE: ICD-10-CM

## 2025-05-13 DIAGNOSIS — M53.82 MUSCULOSKELETAL DISORDER OF THE SUBOCCIPITAL: ICD-10-CM

## 2025-05-13 DIAGNOSIS — M54.2 PAIN, NECK: Primary | ICD-10-CM

## 2025-05-13 PROCEDURE — 97140 MANUAL THERAPY 1/> REGIONS: CPT

## 2025-05-13 NOTE — PROGRESS NOTES
Physical Therapy Daily Treatment Note                      [facility] PT [address] [facility] [place] [address]    Patient: Rocío José   : 1960  Diagnosis/ICD-10 Code:  Pain, neck [M54.2]  Referring practitioner: JANES Christine  Date of Initial Visit: Type: THERAPY  Noted: 2025  Today's Date: 2025  Patient seen for 4 sessions           Subjective   The patient reported that her neck pain was somewhat better after the thoracic mobilizations last session. 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      Objective   See Exercise, Manual, and Modality Logs for complete treatment.     Assessment/Plan  Pt tolerated today's session well. Pt noticed good improvements with last therapy session. 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 Skilled therapy still required in order to address her remaining impairments so that she can return to her PLOF with ADL's.  Continue POC.        Timed:  Manual Therapy:    25     mins  82179;  Therapeutic Exercise:    0     mins  80418;     Neuromuscular Magdalene:   0    mins  12715;    Therapeutic Activity:     0     mins  47484;     Gait Trainin     mins  84259;     Aquatics                         0      mins  04474    Un-timed:  Mechanical Traction      0     mins  43553  Dry Needling     0     mins self-pay  Electrical Stimulation:    0     mins  44509 ( );      Timed Treatment:   25   mins   Total Treatment:     25   mins    Elsa Torre PT    Electronically signed 2025    [place] License: PT - [id number]

## 2025-05-15 ENCOUNTER — TREATMENT (OUTPATIENT)
Dept: PHYSICAL THERAPY | Facility: CLINIC | Age: 65
End: 2025-05-15
Payer: MEDICARE

## 2025-05-15 DIAGNOSIS — M53.82 MUSCULOSKELETAL DISORDER OF THE SUBOCCIPITAL: ICD-10-CM

## 2025-05-15 DIAGNOSIS — M54.2 PAIN, NECK: Primary | ICD-10-CM

## 2025-05-15 DIAGNOSIS — M62.9 MUSCULOSKELETAL DISORDER INVOLVING UPPER TRAPEZIUS MUSCLE: ICD-10-CM

## 2025-05-15 DIAGNOSIS — M54.2 NECK PAIN ON RIGHT SIDE: ICD-10-CM

## 2025-05-15 PROCEDURE — 97140 MANUAL THERAPY 1/> REGIONS: CPT

## 2025-05-15 NOTE — PROGRESS NOTES
Physical Therapy Daily Treatment Note                      [facility]ofie PT [address] [facility] [place] [address]    Patient: Rocío José   : 1960  Diagnosis/ICD-10 Code:  Pain, neck [M54.2]  Referring practitioner: JANES Christine  Date of Initial Visit: Type: THERAPY  Noted: 2025  Today's Date: 5/15/2025  Patient seen for 5 sessions           Subjective   The patient reported that her shoulders felt very heavy this morning.     Objective   See Exercise, Manual, and Modality Logs for complete treatment.     Assessment/Plan  Pt tolerated today's session well. Pt continues to experience relief with STM to her neck musculature. She tolerates this better when seated when compared to supine. She has difficulty lying supine because of her neck pain. Pt's rounded shoulders and forward head posture likely contribute to tension in her upper trapezius, levator scapulae and cervical paraspinals. Skilled therapy is still required in order to decrease her pain and allow her increased tolerance to work, sleeping and other ADL's. Continue POC.       Timed:  Manual Therapy:    25     mins  14519;  Therapeutic Exercise:    0     mins  33210;     Neuromuscular Magdalene:   0    mins  06765;    Therapeutic Activity:     0     mins  56960;     Gait Trainin     mins  91791;     Aquatics                         0      mins  62334    Un-timed:  Mechanical Traction      0     mins  95974  Dry Needling     0     mins self-pay  Electrical Stimulation:    0     mins  23273 ( );      Timed Treatment:   25   mins   Total Treatment:     25   mins    Elsa Torre PT    Electronically signed 5/15/2025    [place] License: PT - [id number]

## 2025-05-20 ENCOUNTER — TREATMENT (OUTPATIENT)
Dept: PHYSICAL THERAPY | Facility: CLINIC | Age: 65
End: 2025-05-20
Payer: MEDICARE

## 2025-05-20 DIAGNOSIS — M54.2 NECK PAIN ON RIGHT SIDE: ICD-10-CM

## 2025-05-20 DIAGNOSIS — M53.82 MUSCULOSKELETAL DISORDER OF THE SUBOCCIPITAL: ICD-10-CM

## 2025-05-20 DIAGNOSIS — M62.9 MUSCULOSKELETAL DISORDER INVOLVING UPPER TRAPEZIUS MUSCLE: ICD-10-CM

## 2025-05-20 DIAGNOSIS — M54.2 PAIN, NECK: Primary | ICD-10-CM

## 2025-05-20 PROCEDURE — 97140 MANUAL THERAPY 1/> REGIONS: CPT

## 2025-05-20 NOTE — PROGRESS NOTES
Physical Therapy Daily Treatment Note                      [facility] PT [address] [facility] [place] [place] [address]    Patient: Rocío José   : 1960  Diagnosis/ICD-10 Code:  Pain, neck [M54.2]  Referring practitioner: JANES Christine  Date of Initial Visit: Type: THERAPY  Noted: 2025  Today's Date: 2025  Patient seen for 6 sessions           Subjective   The patient reported that her neck was hurting the other day because she and her  had to take apart a pool table. She has tried sleeping on her other side at night but tosses and turns during the night and always ends up on her original side.     Objective   See Exercise, Manual, and Modality Logs for complete treatment.     Assessment/Plan  Pt tolerated today's session well. Pt had palpable tension in the R levator scapulae today, especially proximally, near the occiput. Therapist spent a large amount of time working on this area. Pt continues to respond well to STM of the neck musculature. Overall, pt is improving but still having R side neck pain. Skilled therapy is still required in order to decrease her pain and allow her increased tolerance to work, sleeping and other ADL's. Continue POC.        Timed:  Manual Therapy:    25     mins  94313;  Therapeutic Exercise:    0     mins  61397;     Neuromuscular Magdalene:   0    mins  60232;    Therapeutic Activity:     2     mins  98596;     Gait Trainin     mins  40261;     Aquatics                         0      mins  29783    Un-timed:  Mechanical Traction      0     mins  61477  Dry Needling     0     mins self-pay  Electrical Stimulation:    0     mins  13449 ( );      Timed Treatment:   27   mins   Total Treatment:     27   mins    Elsa Torre PT    Electronically signed 2025    [place] License: PT - [id number]

## 2025-05-22 ENCOUNTER — TREATMENT (OUTPATIENT)
Dept: PHYSICAL THERAPY | Facility: CLINIC | Age: 65
End: 2025-05-22
Payer: MEDICARE

## 2025-05-22 DIAGNOSIS — M54.2 NECK PAIN ON RIGHT SIDE: ICD-10-CM

## 2025-05-22 DIAGNOSIS — M62.9 MUSCULOSKELETAL DISORDER INVOLVING UPPER TRAPEZIUS MUSCLE: ICD-10-CM

## 2025-05-22 DIAGNOSIS — M53.82 MUSCULOSKELETAL DISORDER OF THE SUBOCCIPITAL: ICD-10-CM

## 2025-05-22 DIAGNOSIS — M54.2 PAIN, NECK: Primary | ICD-10-CM

## 2025-05-22 PROCEDURE — 97530 THERAPEUTIC ACTIVITIES: CPT

## 2025-05-22 PROCEDURE — 97140 MANUAL THERAPY 1/> REGIONS: CPT

## 2025-05-22 NOTE — PROGRESS NOTES
Progress Note  Sofie PT [address] [place] [address]      Patient: Rocío José   : 1960  Diagnosis/ICD-10 Code:  Pain, neck [M54.2]  Referring practitioner: JANES Christine  Date of Initial Visit: Type: THERAPY  Noted: 2025  Today's Date: 2025  Patient seen for 7 sessions      Subjective:   Subjective Questionnaire: NDI: 14       Clinical Progress: improved  Home Program Compliance: Yes  Treatment has included: therapeutic exercise, neuromuscular re-education, manual therapy, and therapeutic activity    Subjective     Neck pain has been 5/10 on average in the past week. Pt reports that her neck has improved since starting therapy. It used to be bad, and it was all the time. Now, it is not constant. 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 Her pain at night time is better, as well. Pt reports 40% improvement in her neck since starting therapy.       Objective      Static Posture      Head  Forward.     Shoulders  Rounded.     Palpation   Left   Tenderness of the levator scapulae, middle trapezius, pectoralis major, pectoralis minor, rhomboids, suboccipitals and upper trapezius.      Right   No palpable tenderness to the upper trapezius. Tenderness of the cervical paraspinals, levator scapulae, middle trapezius, pectoralis major, pectoralis minor, rhomboids, suboccipitals and upper trapezius.      Active Range of Motion   Cervical/Thoracic Spine   Cervical     Flexion: 42 degrees   Extension: 38 degrees   Left lateral flexion: 38 degrees   Right lateral flexion: 40 degrees   Left rotation: 50 degrees   Right rotation: 58 degrees     Left Shoulder Flexibility Comments:   Tight pectorals  Right Shoulder Flexibility Comments:   Tight pectorals     See Exercise, Manual, and Modality Logs for complete treatment.     Assessment/Plan  Pt originally presented to therapy with complaints of R side neck pain that spans from the R shoulder to the R  side of the occiput. Symptoms and results of objective testing are consistent with muscle tightness of the R upper trapezius, R suboccipitals and R medial scapular musculature, likely caused by repetitive/consistent posturing. Pt's score on the NDI has not improved since the initial evaluation. However, this is not consistent with what she has verbally told the therapist, as she has verbalized 40% improvement in her neck symptoms since starting therapy. This allowed pt to meet one of her goals today. Pt has also demonstrated an increase in cervical AROM since starting therapy, especially with flexion and extension. Pt's neck pain has improved at night time and at work. However, she still experiences R side neck pain at the R side of the occiput and between the fibers of the R SCM and R upper trapezius muscles, especially when she rotates her head to the right. Skilled therapy is still required in order to address the aforementioned impairments so that she has increased tolerance to ADL's, such as sleeping through the night undisturbed. Continue POC.    Goals  Plan Goals: CERVICAL PROBLEMS     1. The patient has limited ROM.                       LTG 1: 12 weeks:  The patient will demonstrate 60° of B cervical spine rotation, 45° of B cervical side bending, 45° of cervical flexion, and 35° of cervical extension in order to adequately monitor blind spots while driving and improve ability to perform activities of daily living.                          STATUS:  progressing   STG 1a: 6 weeks:  The patient will demonstrate 56° of L cervical spine rotation, 40° of B cervical side bending, 40° of cervical flexion, and 30° of cervical extension in order to adequately monitor blind spots while driving and improve ability to perform activities of daily living.                                      STATUS:  progressing                 2. The patient has complaints of pain.              LTG 2: 12 weeks:  The patient will report  1/10 pain or better, on average in the past week, in order to more easily tolerate activities of daily living and improve sleep quality.                          STATUS: progressing               STG 2a: 6 weeks:  The patient will report 3/10 pain or better, on average in the past week.                          STATUS:  progressing      3. The patient reports symptoms of her neck that are currently limiting her functional abilities.               LTG 3: 12 weeks:  The patient will report  70% improvement in her neck pain since starting therapy, in order to allow her increased tolerance to ADL's, such as checking her blind spots while driving and sleeping through the night.                           STATUS:  progressing               STG 3a: 6 weeks:   The patient will report  20% improvement in her neck pain since starting therapy, in order to allow her increased tolerance to ADL's, such as checking her blind spots while driving and sleeping through the night.                           STATUS:  MET     4. Carrying, Moving, and Handling Objects Functional Limitation                               LTG 4: 12 weeks:  The patient will demonstrate 8% disability or less on the Neck Disability Index, in order to allow her increased tolerance to ADL's, such as checking her blind spots while driving and sleeping through the night.                           STATUS:  progressing              STG 4a: 6 weeks:  The patient will demonstrate 13% disability or less on the Neck Disability Index.                            STATUS:  progressing    Progress toward previous goals: Partially Met      Recommendations: Continue as planned  Timeframe: 1 month  Prognosis to achieve goals: good    Signature: Elsa Torre PT    Electronically signed 5/22/2025    [place] License: PT - [id number]      Timed:  Manual Therapy:    17     mins  43200;  Therapeutic Exercise:    0     mins  86317;     Neuromuscular Magdalene:    0    mins  20013;    Therapeutic  Activity:     10     mins  65965;     Gait Trainin     mins  89879;     Aquatics                         0      mins  42911    Un-timed:  Mechanical Traction      0     mins  83452  Dry Needling     0     mins self-pay  Electrical Stimulation:    0     mins  07509 ( );    Timed Treatment:   27   mins   Total Treatment:     27   mins

## 2025-05-29 ENCOUNTER — TREATMENT (OUTPATIENT)
Dept: PHYSICAL THERAPY | Facility: CLINIC | Age: 65
End: 2025-05-29
Payer: MEDICARE

## 2025-05-29 DIAGNOSIS — M54.2 NECK PAIN ON RIGHT SIDE: ICD-10-CM

## 2025-05-29 DIAGNOSIS — M62.9 MUSCULOSKELETAL DISORDER INVOLVING UPPER TRAPEZIUS MUSCLE: ICD-10-CM

## 2025-05-29 DIAGNOSIS — M54.2 PAIN, NECK: Primary | ICD-10-CM

## 2025-05-29 DIAGNOSIS — M53.82 MUSCULOSKELETAL DISORDER OF THE SUBOCCIPITAL: ICD-10-CM

## 2025-05-29 PROCEDURE — 97140 MANUAL THERAPY 1/> REGIONS: CPT

## 2025-05-29 NOTE — PROGRESS NOTES
Physical Therapy Daily Treatment Note                      [facility] [address] [facility] [place] [address]    Patient: Rocío José   : 1960  Diagnosis/ICD-10 Code:  Pain, neck [M54.2]  Referring practitioner: JANES Christine  Date of Initial Visit: Type: THERAPY  Noted: 2025  Today's Date: 2025  Patient seen for 8 sessions           Subjective   The patient reported that her neck pain is better that it was. She still gets pain sometimes when looking to the R. She especially still has neck discomfort at the end of the day.    Objective   See Exercise, Manual, and Modality Logs for complete treatment.     Assessment/Plan  Pt tolerated today's session well. Pt continues to respond well to STM of the neck musculature. Therapist also implemented contract/relax muscle techniques today in order to improve cervical rotation B and side bending B. Overall, pt is improving but still having R side neck pain, especially at the end of the day. Skilled therapy is still required in order to decrease her pain and allow her increased tolerance to work, sleeping and other ADL's. Continue POC.        Timed:  Manual Therapy:    27     mins  50163;  Therapeutic Exercise:    0     mins  99605;     Neuromuscular Magdalene:   0    mins  23355;    Therapeutic Activity:     0     mins  63060;     Gait Trainin     mins  03143;     Aquatics                         0      mins  36539    Un-timed:  Mechanical Traction      0     mins  30658  Dry Needling     0     mins self-pay  Electrical Stimulation:    0     mins  80670 ( );      Timed Treatment:   27   mins   Total Treatment:     27   mins    Elsa Torre PT    Electronically signed 2025    [place] License: PT - [id number]

## 2025-06-03 ENCOUNTER — TREATMENT (OUTPATIENT)
Dept: PHYSICAL THERAPY | Facility: CLINIC | Age: 65
End: 2025-06-03
Payer: MEDICARE

## 2025-06-03 DIAGNOSIS — M54.2 NECK PAIN ON RIGHT SIDE: ICD-10-CM

## 2025-06-03 DIAGNOSIS — M62.9 MUSCULOSKELETAL DISORDER INVOLVING UPPER TRAPEZIUS MUSCLE: ICD-10-CM

## 2025-06-03 DIAGNOSIS — M54.2 PAIN, NECK: Primary | ICD-10-CM

## 2025-06-03 DIAGNOSIS — M53.82 MUSCULOSKELETAL DISORDER OF THE SUBOCCIPITAL: ICD-10-CM

## 2025-06-03 PROCEDURE — 97140 MANUAL THERAPY 1/> REGIONS: CPT

## 2025-06-03 NOTE — PROGRESS NOTES
Physical Therapy Daily Treatment Note                      [facility] [address] [place] [address]    Patient: Rocío José   : 1960  Diagnosis/ICD-10 Code:  Pain, neck [M54.2]  Referring practitioner: JANES Christine  Date of Initial Visit: Type: THERAPY  Noted: 2025  Today's Date: 6/3/2025  Patient seen for 9 sessions           Subjective      The patient reported that she got dry needling in her neck the other day. This helped a little bit. Her pain will come in spurts. She will be fine, then all the sudden it starts hurting again, and then will go away.     Objective   See Exercise, Manual, and Modality Logs for complete treatment.     Assessment/Plan  Pt tolerated today's session well. Pt continues to respond well to STM of the neck musculature. She continues to have pain that seems to be originating from the R levator scapulae. Pt verbalized that her neck did feel better after manual treatments today. Overall, pt is improving but still having R side neck pain, especially at the end of the work day. Skilled therapy is still required in order to decrease her pain and allow her increased tolerance to work, sleeping and other ADL's. Continue POC.        Timed:  Manual Therapy:    26   mins  64797;  Therapeutic Exercise:    0     mins  98990;     Neuromuscular Magdalene:   0    mins  33131;    Therapeutic Activity:     0     mins  98596;     Gait Trainin     mins  73266;     Aquatics                         0      mins  00248    Un-timed:  Mechanical Traction      0     mins  57474  Dry Needling     0     mins self-pay  Electrical Stimulation:    0     mins  97570 ( );      Timed Treatment:   26   mins   Total Treatment:     26   mins    Elsa Torre PT    Electronically signed 6/3/2025    [place] License: PT - [id number]

## 2025-06-12 ENCOUNTER — TREATMENT (OUTPATIENT)
Dept: PHYSICAL THERAPY | Facility: CLINIC | Age: 65
End: 2025-06-12
Payer: MEDICARE

## 2025-06-12 DIAGNOSIS — M54.2 NECK PAIN ON RIGHT SIDE: ICD-10-CM

## 2025-06-12 DIAGNOSIS — M53.82 MUSCULOSKELETAL DISORDER OF THE SUBOCCIPITAL: ICD-10-CM

## 2025-06-12 DIAGNOSIS — M62.9 MUSCULOSKELETAL DISORDER INVOLVING UPPER TRAPEZIUS MUSCLE: ICD-10-CM

## 2025-06-12 DIAGNOSIS — M54.2 PAIN, NECK: Primary | ICD-10-CM

## 2025-06-12 PROCEDURE — 97140 MANUAL THERAPY 1/> REGIONS: CPT

## 2025-06-12 NOTE — PROGRESS NOTES
Physical Therapy Daily Treatment Note                      [facility] [address] [place] [address]    Patient: Rocío José   : 1960  Diagnosis/ICD-10 Code:  Pain, neck [M54.2]  Referring practitioner: JANES Christine  Date of Initial Visit: Type: THERAPY  Noted: 2025  Today's Date: 2025  Patient seen for 10 sessions           Subjective   The patient reported that it still hurts on the R side when she side bends her neck to the R.She also has some R side pain during end range R cervical rotation. She has been trying to do her stretches and wall bartolo exercises more. Sometimes these aggravate her pain.     Pt reports that significant progress has been made in her neck pain since beginning therapy.    Objective   See Exercise, Manual, and Modality Logs for complete treatment.     Assessment/Plan  Pt tolerated today's session well. Pt continues to respond well to STM of the neck musculature. She continues to have pain that seems to be originating from the R levator scapulae. Pt is progressing well, as evident by decreasing pain, overall. Skilled therapy is still required in order to decrease her pain and allow her increased tolerance to work, sleeping and other ADL's. Continue POC.        Timed:  Manual Therapy:    26     mins  90174;  Therapeutic Exercise:    0     mins  35837;     Neuromuscular Magdalene:   0    mins  81967;    Therapeutic Activity:          mins  91128;     Gait Trainin     mins  44109;     Aquatics                         0      mins  91026    Un-timed:  Mechanical Traction      0     mins  78323  Dry Needling     0     mins self-pay  Electrical Stimulation:    0     mins  42083 ( );      Timed Treatment:   26   mins   Total Treatment:     26   mins    Elsa Torre PT    Electronically signed 2025    [place] License: PT - [id number]

## 2025-06-25 ENCOUNTER — TREATMENT (OUTPATIENT)
Dept: PHYSICAL THERAPY | Facility: CLINIC | Age: 65
End: 2025-06-25
Payer: MEDICARE

## 2025-06-25 DIAGNOSIS — M54.2 NECK PAIN ON RIGHT SIDE: ICD-10-CM

## 2025-06-25 DIAGNOSIS — M53.82 MUSCULOSKELETAL DISORDER OF THE SUBOCCIPITAL: ICD-10-CM

## 2025-06-25 DIAGNOSIS — M54.2 PAIN, NECK: Primary | ICD-10-CM

## 2025-06-25 DIAGNOSIS — M62.9 MUSCULOSKELETAL DISORDER INVOLVING UPPER TRAPEZIUS MUSCLE: ICD-10-CM

## 2025-06-25 PROCEDURE — 97140 MANUAL THERAPY 1/> REGIONS: CPT

## 2025-06-25 PROCEDURE — 97530 THERAPEUTIC ACTIVITIES: CPT

## 2025-06-25 NOTE — PROGRESS NOTES
"Progress Note  [facility] [address] [facility] [place] [address]      Patient: Rocío José   : 1960  Diagnosis/ICD-10 Code:  Pain, neck [M54.2]  Referring practitioner: JANES Christine  Date of Initial Visit: Type: THERAPY  Noted: 2025  Today's Date: 2025  Patient seen for 11 sessions      Subjective:   Subjective Questionnaire: NDI:11   (22% disability)   Clinical Progress: improved  Home Program Compliance: Yes  Treatment has included: therapeutic exercise, neuromuscular re-education, manual therapy, and therapeutic activity    Subjective     Pt returns to therapy from recent travelling. Pt reports that driving wasn't that bad. Sleeping in a lot of different beds was not that different than her normal. She reports that it is just tight. It feels like a \"pull\" \"tight.\" If she tries to stretch and then changes directions of her neck, it hurts. Neck pain has been 4-5/10 on average in the past week. Pt reports 55% improvement in her neck since starting therapy. Pt reports that she is doing her HEP. Pt reports that her neck pain did not get worse when she was travelling.     Objective        Static Posture      Head  Forward.     Shoulders  Rounded.     Palpation   Left   Tenderness of the levator scapulae, middle trapezius, pectoralis major, pectoralis minor, rhomboids, suboccipitals and upper trapezius.      Right   No palpable tenderness to the upper trapezius. Tenderness of the cervical paraspinals, levator scapulae, middle trapezius, pectoralis major, pectoralis minor, rhomboids, suboccipitals and upper trapezius.      Active Range of Motion   Cervical/Thoracic Spine   Cervical     Flexion: 50 degrees   Extension: 36 degrees   Left lateral flexion: 38 degrees   Right lateral flexion: 38 degrees   Left rotation: 50 degrees   Right rotation: 59 degrees      Left Shoulder Flexibility Comments:   Tight pectorals  Right Shoulder Flexibility Comments:   Tight pectorals   See " Exercise, Manual, and Modality Logs for complete treatment.     Assessment/Plan  Pt originally presented to therapy with complaints of R side neck pain that spans from the R shoulder to the R side of the occiput. Symptoms and results of objective testing are consistent with muscle tightness of the R upper trapezius, R suboccipitals and R medial scapular musculature, likely caused by repetitive/consistent posturing. Today was pt's second reassessment day. Pt's score on the NDI has improved since her last progress note, which indicates improvements of perceived function of her neck. This is consistent consistent with pt's verbalization that she has had 55% improvement in her neck symptoms since starting therapy. Pt has also demonstrated an increase in cervical AROM since starting therapy and a slight improvement in this area since her last progress note. However, her cervical AROM has not improved enough to allow her to meet either of her cervical ROM goals. Pt has met a total of one of her goals. She was unable to meet any additional goals today. She has 7 unmet goals remaining. She still experiences R side neck pain at the R side of the occiput and between the fibers of the R SCM and R upper trapezius muscles, especially when she rotates her head to the right. Skilled therapy is still required in order to address the aforementioned impairments so that she has increased tolerance to ADL's, such as sleeping through the night undisturbed. Continue POC.     Goals  Plan Goals: CERVICAL PROBLEMS     1. The patient has limited ROM.                       LTG 1: 12 weeks:  The patient will demonstrate 60° of B cervical spine rotation, 45° of B cervical side bending, 45° of cervical flexion, and 35° of cervical extension in order to adequately monitor blind spots while driving and improve ability to perform activities of daily living.                          STATUS:  progressing   STG 1a: 6 weeks:  The patient will  demonstrate 56° of L cervical spine rotation, 40° of B cervical side bending, 40° of cervical flexion, and 30° of cervical extension in order to adequately monitor blind spots while driving and improve ability to perform activities of daily living.                                      STATUS:  progressing                 2. The patient has complaints of pain.              LTG 2: 12 weeks:  The patient will report 1/10 pain or better, on average in the past week, in order to more easily tolerate activities of daily living and improve sleep quality.                          STATUS: progressing               STG 2a: 6 weeks:  The patient will report 3/10 pain or better, on average in the past week.                          STATUS:  progressing      3. The patient reports symptoms of her neck that are currently limiting her functional abilities.               LTG 3: 12 weeks:  The patient will report  70% improvement in her neck pain since starting therapy, in order to allow her increased tolerance to ADL's, such as checking her blind spots while driving and sleeping through the night.                           STATUS:  progressing               STG 3a: 6 weeks:   The patient will report  20% improvement in her neck pain since starting therapy, in order to allow her increased tolerance to ADL's, such as checking her blind spots while driving and sleeping through the night.                           STATUS:  MET     4. Carrying, Moving, and Handling Objects Functional Limitation                               LTG 4: 12 weeks:  The patient will demonstrate 8% disability or less on the Neck Disability Index, in order to allow her increased tolerance to ADL's, such as checking her blind spots while driving and sleeping through the night.                           STATUS:  progressing              STG 4a: 6 weeks:  The patient will demonstrate 13% disability or less on the Neck Disability Index.                             STATUS:  progressing      Progress toward previous goals: Partially Met      Recommendations: Continue as planned  Timeframe: 1 month  Prognosis to achieve goals: good    Signature: Elsa Torre PT    Electronically signed 2025    [place] License: PT - [id number]      Timed:  Manual Therapy:    20     mins  74579;  Therapeutic Exercise:    0     mins  23436;     Neuromuscular Magdalene:    0    mins  92878;    Therapeutic Activity:     8     mins  71248;     Gait Trainin     mins  53271;     Aquatics                         0      mins  41359    Un-timed:  Mechanical Traction      0     mins  57824  Dry Needling     0     mins self-pay  Electrical Stimulation:    0     mins  35886 ( );    Timed Treatment:   28   mins   Total Treatment:     28   mins

## 2025-07-10 ENCOUNTER — TREATMENT (OUTPATIENT)
Dept: PHYSICAL THERAPY | Facility: CLINIC | Age: 65
End: 2025-07-10
Payer: MEDICARE

## 2025-07-10 DIAGNOSIS — M54.2 PAIN, NECK: Primary | ICD-10-CM

## 2025-07-10 DIAGNOSIS — M62.9 MUSCULOSKELETAL DISORDER INVOLVING UPPER TRAPEZIUS MUSCLE: ICD-10-CM

## 2025-07-10 DIAGNOSIS — M54.2 NECK PAIN ON RIGHT SIDE: ICD-10-CM

## 2025-07-10 DIAGNOSIS — M53.82 MUSCULOSKELETAL DISORDER OF THE SUBOCCIPITAL: ICD-10-CM

## 2025-07-10 NOTE — PROGRESS NOTES
Outpatient Physical Therapy                   Physical Therapy Daily Treatment Note    Patient: Rocío José   : 1960  Diagnosis/ICD-10 Code:  Pain, neck [M54.2]  Referring practitioner: JANES Christine  Date of Initial Visit: Type: THERAPY  Noted: 2025  Today's Date: 7/10/2025  Patient seen for 12 sessions             Subjective   Rocío José reports: 90% of her pain is on the right side of her neck and 10% is on the left side.     Pain: 4/10 pain, with lateral flexion to the right and just tightness with lateral flexion to the left.     Objective     See Exercise, Manual, and Modality Logs for complete treatment.     Assessment/Plan  Pt was very stiff at the beginning of the session but as muscle releases were achieved stiffness improved. The manual therapy was focused on the right side but preformed bilaterally according to pain and tightness.     Progress per Plan of Care      Timed:  Manual Therapy:    26     mins  54832;  Therapeutic Exercise:    0     mins  00749;  Neuromuscular Magdalene:    0    mins  53905;  Therapeutic Activity:     0     mins  58521;  Self care:                       0     mins  29321;  Gait Trainin     mins  61559;  Ultrasound:                    0     mins  99306;    Untimed:  Mechanical Traction:    0     mins  46349;  Electrical Stimulation:    0     mins  81998 ( );      Timed Treatment:   26   mins  Total Treatment:     26   mins      Electronically signed:   Milka Krause PTA  Physical Therapist Assistant  [facility] License #: [id number]

## 2025-07-11 ENCOUNTER — TREATMENT (OUTPATIENT)
Dept: PHYSICAL THERAPY | Facility: CLINIC | Age: 65
End: 2025-07-11
Payer: MEDICARE

## 2025-07-11 DIAGNOSIS — M54.2 NECK PAIN ON RIGHT SIDE: ICD-10-CM

## 2025-07-11 DIAGNOSIS — M54.2 PAIN, NECK: Primary | ICD-10-CM

## 2025-07-11 DIAGNOSIS — M62.9 MUSCULOSKELETAL DISORDER INVOLVING UPPER TRAPEZIUS MUSCLE: ICD-10-CM

## 2025-07-11 DIAGNOSIS — M53.82 MUSCULOSKELETAL DISORDER OF THE SUBOCCIPITAL: ICD-10-CM

## 2025-07-11 PROCEDURE — 97140 MANUAL THERAPY 1/> REGIONS: CPT | Performed by: PHYSICAL THERAPIST

## 2025-07-11 NOTE — PROGRESS NOTES
"Outpatient Physical Therapy                   Physical Therapy Daily Treatment Note    Patient: Rocío José   : 1960  Diagnosis/ICD-10 Code:  Pain, neck [M54.2]  Referring practitioner: JANES Christine  Date of Initial Visit: Type: THERAPY  Noted: 2025  Today's Date: 2025  Patient seen for 13 sessions             Subjective   Rocío José reports: she felt good after last session - can't sleep on left side     Objective     See Exercise, Manual, and Modality Logs for complete treatment.     Assessment/Plan  Pt had decreased tension after yesterdays appointment so deeper STM was able to be preformed. Pt notes burning with TPR today and a \"hurt so good\" with rhomboid STM. Pt required slightly more work on the left side today as it was tighter than the right side.     Progress per Plan of Care      Timed:  Manual Therapy:    25     mins  14999;  Therapeutic Exercise:    0     mins  40476;  Neuromuscular Magdalene:    0    mins  60085;  Therapeutic Activity:     0     mins  44252;  Self care:                       0     mins  07021;  Gait Trainin     mins  77858;  Ultrasound:                    0     mins  06744;    Untimed:  Mechanical Traction:    0     mins  62504;  Electrical Stimulation:    0     mins  64232 ( );      Timed Treatment:   25   mins  Total Treatment:     25   mins      Electronically signed:   Milka Krause PTA  Physical Therapist Assistant  [facility] License #: [id number]  "

## 2025-07-16 ENCOUNTER — TREATMENT (OUTPATIENT)
Dept: PHYSICAL THERAPY | Facility: CLINIC | Age: 65
End: 2025-07-16
Payer: MEDICARE

## 2025-07-16 DIAGNOSIS — M53.82 MUSCULOSKELETAL DISORDER OF THE SUBOCCIPITAL: ICD-10-CM

## 2025-07-16 DIAGNOSIS — M54.2 PAIN, NECK: Primary | ICD-10-CM

## 2025-07-16 DIAGNOSIS — M62.9 MUSCULOSKELETAL DISORDER INVOLVING UPPER TRAPEZIUS MUSCLE: ICD-10-CM

## 2025-07-16 DIAGNOSIS — M54.2 NECK PAIN ON RIGHT SIDE: ICD-10-CM

## 2025-07-16 PROCEDURE — 97140 MANUAL THERAPY 1/> REGIONS: CPT

## 2025-07-16 NOTE — PROGRESS NOTES
Physical Therapy Daily Treatment Note                      [facility] [address] [place] [address]    Patient: Rocío José   : 1960  Diagnosis/ICD-10 Code:  Pain, neck [M54.2]  Referring practitioner: JANES Christine  Date of Initial Visit: Type: THERAPY  Noted: 2025  Today's Date: 2025  Patient seen for 14 sessions           Subjective   The patient reported that the R side of her neck still hurts when she side bends or rotates her head to the R.     Objective   See Exercise, Manual, and Modality Logs for complete treatment.     Assessment/Plan  Pt tolerated today's session well. Pt continues to respond well to STM of the neck musculature. She continues to have pain that seems to be originating from the R levator scapulae.  Skilled therapy is still required in order to decrease her pain and allow her increased tolerance to work, sleeping and other ADL's. Continue POC.        Timed:  Manual Therapy:    26     mins  36939;  Therapeutic Exercise:    0     mins  02248;     Neuromuscular Magdalene:   0    mins  41342;    Therapeutic Activity:     0     mins  35849;     Gait Trainin     mins  71692;     Aquatics                         0      mins  97456    Un-timed:  Mechanical Traction      0     mins  89116  Dry Needling     0     mins self-pay  Electrical Stimulation:    0     mins  32339 ( );      Timed Treatment:   26   mins   Total Treatment:     26   mins    Elsa Torre PT    Electronically signed 2025    [place] License: PT - [id number]

## 2025-07-18 ENCOUNTER — TREATMENT (OUTPATIENT)
Dept: PHYSICAL THERAPY | Facility: CLINIC | Age: 65
End: 2025-07-18
Payer: MEDICARE

## 2025-07-18 DIAGNOSIS — M53.82 MUSCULOSKELETAL DISORDER OF THE SUBOCCIPITAL: ICD-10-CM

## 2025-07-18 DIAGNOSIS — M54.2 NECK PAIN ON RIGHT SIDE: ICD-10-CM

## 2025-07-18 DIAGNOSIS — M62.9 MUSCULOSKELETAL DISORDER INVOLVING UPPER TRAPEZIUS MUSCLE: ICD-10-CM

## 2025-07-18 DIAGNOSIS — M54.2 PAIN, NECK: Primary | ICD-10-CM

## 2025-07-18 PROCEDURE — 97140 MANUAL THERAPY 1/> REGIONS: CPT

## 2025-07-18 NOTE — PROGRESS NOTES
Physical Therapy Daily Treatment Note                      [facility] [address] [facility] [place] [address]    Patient: Rocío José   : 1960  Diagnosis/ICD-10 Code:  Pain, neck [M54.2]  Referring practitioner: JANES Christine  Date of Initial Visit: Type: THERAPY  Noted: 2025  Today's Date: 2025  Patient seen for 15 sessions           Subjective   The patient reported that her neck was a little sore this morning, which was a bit unusual. Pain is not constant during the day like it was.     Objective   See Exercise, Manual, and Modality Logs for complete treatment.     Assessment/Plan  Pt tolerated today's session well. Origin, insertion and midsubstance of the right levator scapulae continue to be the cause of her pain. Pt noted decreased R side neck pain after STM. Skilled therapy is still required in order to decrease her pain and allow her increased tolerance to work, sleeping and other ADL's. Continue POC.        Timed:  Manual Therapy:    27     mins  62667;  Therapeutic Exercise:    0     mins  47223;     Neuromuscular Magdalene:   0    mins  01798;    Therapeutic Activity:     0     mins  77314;     Gait Trainin     mins  60736;     Aquatics                         0      mins  65241    Un-timed:  Mechanical Traction      0     mins  33071  Dry Needling     0     mins self-pay  Electrical Stimulation:    0     mins  79591 ( );      Timed Treatment:   27   mins   Total Treatment:     27   mins    Elsa Torre PT    Electronically signed 2025    [place] License: PT - [id number]

## 2025-07-22 ENCOUNTER — TREATMENT (OUTPATIENT)
Dept: PHYSICAL THERAPY | Facility: CLINIC | Age: 65
End: 2025-07-22
Payer: MEDICARE

## 2025-07-22 DIAGNOSIS — M54.2 NECK PAIN ON RIGHT SIDE: ICD-10-CM

## 2025-07-22 DIAGNOSIS — M53.82 MUSCULOSKELETAL DISORDER OF THE SUBOCCIPITAL: ICD-10-CM

## 2025-07-22 DIAGNOSIS — M54.2 PAIN, NECK: Primary | ICD-10-CM

## 2025-07-22 DIAGNOSIS — M62.9 MUSCULOSKELETAL DISORDER INVOLVING UPPER TRAPEZIUS MUSCLE: ICD-10-CM

## 2025-07-22 PROCEDURE — 97140 MANUAL THERAPY 1/> REGIONS: CPT

## 2025-07-22 NOTE — PROGRESS NOTES
Physical Therapy Daily Treatment Note                      [facility] [address] [facility] [place] [address]    Patient: Rocío José   : 1960  Diagnosis/ICD-10 Code:  Pain, neck [M54.2]  Referring practitioner: JANES Christine  Date of Initial Visit: Type: THERAPY  Noted: 2025  Today's Date: 2025  Patient seen for 16 sessions           Subjective   The patient reported no new complaints today.     Objective   See Exercise, Manual, and Modality Logs for complete treatment.     Assessment/Plan  Pt tolerated today's session well. Therapist spent a good amount of time performing STM to the midsubstance of the R levator scapulae today.  Pt continues to respond well to STM. Skilled therapy is still required in order to decrease her pain and allow her increased tolerance to work, sleeping and other ADL's. Continue POC.        Timed:  Manual Therapy:    26     mins  26163;  Therapeutic Exercise:    0     mins  81686;     Neuromuscular Magdalene:   0    mins  91281;    Therapeutic Activity:     0     mins  72835;     Gait Trainin     mins  93580;     Aquatics                         0      mins  65504    Un-timed:  Mechanical Traction      0     mins  56977  Dry Needling     0     mins self-pay  Electrical Stimulation:    0     mins  66138 ( );      Timed Treatment:   26   mins   Total Treatment:     26   mins    Elsa Torre PT    Electronically signed 2025    [place] License: PT - [id number]

## 2025-07-29 ENCOUNTER — TREATMENT (OUTPATIENT)
Dept: PHYSICAL THERAPY | Facility: CLINIC | Age: 65
End: 2025-07-29
Payer: MEDICARE

## 2025-07-29 DIAGNOSIS — M54.2 NECK PAIN ON RIGHT SIDE: ICD-10-CM

## 2025-07-29 DIAGNOSIS — M54.2 PAIN, NECK: Primary | ICD-10-CM

## 2025-07-29 DIAGNOSIS — M53.82 MUSCULOSKELETAL DISORDER OF THE SUBOCCIPITAL: ICD-10-CM

## 2025-07-29 DIAGNOSIS — M62.9 MUSCULOSKELETAL DISORDER INVOLVING UPPER TRAPEZIUS MUSCLE: ICD-10-CM

## 2025-07-29 PROCEDURE — 97140 MANUAL THERAPY 1/> REGIONS: CPT

## 2025-07-29 PROCEDURE — 97530 THERAPEUTIC ACTIVITIES: CPT

## 2025-07-29 NOTE — PROGRESS NOTES
Re-Assessment / Re-Certification  [facility] [address] [place] [address]      Patient: Rocío José   : 1960  Diagnosis/ICD-10 Code:  Pain, neck [M54.2]  Referring practitioner: JANES Christine  Date of Initial Visit: Type: THERAPY  Noted: 2025  Today's Date: 2025  Patient seen for 17 sessions      Subjective:   Subjective Questionnaire: NDI: 13/50    (26% disability)  Clinical Progress: improved  Home Program Compliance: Yes  Treatment has included: therapeutic exercise, neuromuscular re-education, manual therapy, and therapeutic activity    Subjective     Pt reports that she has difficultly falling asleep due to her next pain. Pt reports that she sleeps on her R side and has a good pillow. Pt reports that her neck was feeling better while in bed, but it has recently started bothering her again when in bed. Pt reports that her neck pain has been at least 4/10 on average in the past week. If she stretches, she gives herself more pain. Pt reports 55% improvement in her pain since starting therapy. Looking to the right to talk to someone still bothers her.    Objective         Static Posture      Head  Forward.     Shoulders  Rounded.     Palpation   Left   Tenderness of the levator scapulae, middle trapezius, pectoralis major, pectoralis minor, rhomboids, suboccipitals and upper trapezius.      Right   No palpable tenderness to the upper trapezius. Tenderness of the cervical paraspinals, levator scapulae, middle trapezius, pectoralis major, pectoralis minor, rhomboids, suboccipitals and upper trapezius.      Active Range of Motion   Cervical/Thoracic Spine   Cervical     Flexion: 50 degrees   Extension: 40 degrees   Left lateral flexion: 45 degrees   Right lateral flexion: 42 degrees   Left rotation: 60 degrees   Right rotation: 62 degrees      Left Shoulder Flexibility Comments:   Tight pectorals  Right Shoulder Flexibility Comments:   Tight pectorals     See  Exercise, Manual, and Modality Logs for complete treatment.     Assessment/Plan  Pt originally presented to therapy with complaints of R side neck pain that spans from the R shoulder to the R side of the occiput. Symptoms and results of objective testing are consistent with muscle tightness of the R upper trapezius, R suboccipitals and R medial scapular musculature, likely caused by repetitive/consistent posturing. Today was  reassessment day. Pt has been doing well with therapy and experiences good maintenance of her pain relief for the most part. She is not pain-free, but is about 55% improved, subjectively. Pt was able to improve her cervical AROM today, which allowed her to meet her short-term ROM goal. Pt's score on the NDI did not improve today, indicating continued perceived deficits of her neck, especially with sleeping. She has 6 unmet goals remaining. Skilled therapy is still required in order to address the aforementioned impairments so that she has increased tolerance to ADL's, such as sleeping through the night undisturbed. Continue POC.       Goals  Plan Goals: CERVICAL PROBLEMS     1. The patient has limited ROM.                       LTG 1: 12 weeks:  The patient will demonstrate 60° of B cervical spine rotation, 45° of B cervical side bending, 45° of cervical flexion, and 35° of cervical extension in order to adequately monitor blind spots while driving and improve ability to perform activities of daily living.                          STATUS:  progressing   STG 1a: 6 weeks:  The patient will demonstrate 56° of L cervical spine rotation, 40° of B cervical side bending, 40° of cervical flexion, and 30° of cervical extension in order to adequately monitor blind spots while driving and improve ability to perform activities of daily living.                                      STATUS:  MET                2. The patient has complaints of pain.              LTG 2: 12 weeks:  The patient will report 1/10  pain or better, on average in the past week, in order to more easily tolerate activities of daily living and improve sleep quality.                          STATUS: progressing               STG 2a: 6 weeks:  The patient will report 3/10 pain or better, on average in the past week.                          STATUS:  progressing      3. The patient reports symptoms of her neck that are currently limiting her functional abilities.               LTG 3: 12 weeks:  The patient will report  70% improvement in her neck pain since starting therapy, in order to allow her increased tolerance to ADL's, such as checking her blind spots while driving and sleeping through the night.                           STATUS:  progressing               STG 3a: 6 weeks:   The patient will report  20% improvement in her neck pain since starting therapy, in order to allow her increased tolerance to ADL's, such as checking her blind spots while driving and sleeping through the night.                           STATUS:  MET     4. Carrying, Moving, and Handling Objects Functional Limitation                               LTG 4: 12 weeks:  The patient will demonstrate 8% disability or less on the Neck Disability Index, in order to allow her increased tolerance to ADL's, such as checking her blind spots while driving and sleeping through the night.                           STATUS:  progressing              STG 4a: 6 weeks:  The patient will demonstrate 13% disability or less on the Neck Disability Index.                            STATUS:  progressing    Progress toward previous goals: Partially Met      Recommendations: Continue as planned  Timeframe: 1 month  Prognosis to achieve goals: good    Signature: Elsa Torre PT    Electronically signed 7/29/2025    [place] License: PT - [id number]      Based upon review of the patient's progress and continued therapy plan, it is my medical opinion that Rocío José should continue physical therapy treatment  at [facility]  [facility]  [address]  [place] [address] [phone].    Signature: __________________________________  Hyun Collado APRN    90 Day Recertification  Certification Period: 2025 thru 10/26/2025  I certify that the therapy services are furnished while this patient is under my care.  The services outlined above are required by this patient, and will be reviewed every 90 days.     PHYSICIAN: Hyun Collado APRN  NPI: [id number]      DATE:     Please sign and return via fax to [phone]. Thank you, [facility].    Timed:  Manual Therapy:    23     mins  [id number];  Therapeutic Exercise:    0   mins  89027;     Neuromuscular Magdalene:    0    mins  [id number];    Therapeutic Activity:     10     mins  93516;     Gait Trainin     mins  [id number];     Aquatics                         0      mins  [id number]    Un-timed:  Mechanical Traction      0     mins  [id number]  Dry Needling     0     mins self-pay  Electrical Stimulation:    0     mins  97607 ( );    Timed Treatment:   33   mins   Total Treatment:     33   mins

## 2025-08-12 ENCOUNTER — TREATMENT (OUTPATIENT)
Dept: PHYSICAL THERAPY | Facility: CLINIC | Age: 65
End: 2025-08-12
Payer: MEDICARE

## 2025-08-12 DIAGNOSIS — M54.2 NECK PAIN ON RIGHT SIDE: ICD-10-CM

## 2025-08-12 DIAGNOSIS — M62.9 MUSCULOSKELETAL DISORDER INVOLVING UPPER TRAPEZIUS MUSCLE: ICD-10-CM

## 2025-08-12 DIAGNOSIS — M54.2 PAIN, NECK: Primary | ICD-10-CM

## 2025-08-12 DIAGNOSIS — M53.82 MUSCULOSKELETAL DISORDER OF THE SUBOCCIPITAL: ICD-10-CM

## 2025-08-12 PROCEDURE — 97140 MANUAL THERAPY 1/> REGIONS: CPT

## 2025-08-21 ENCOUNTER — TREATMENT (OUTPATIENT)
Dept: PHYSICAL THERAPY | Facility: CLINIC | Age: 65
End: 2025-08-21
Payer: MEDICARE

## 2025-08-21 DIAGNOSIS — M53.82 MUSCULOSKELETAL DISORDER OF THE SUBOCCIPITAL: ICD-10-CM

## 2025-08-21 DIAGNOSIS — M54.2 NECK PAIN ON RIGHT SIDE: ICD-10-CM

## 2025-08-21 DIAGNOSIS — M62.9 MUSCULOSKELETAL DISORDER INVOLVING UPPER TRAPEZIUS MUSCLE: ICD-10-CM

## 2025-08-21 DIAGNOSIS — M54.2 PAIN, NECK: Primary | ICD-10-CM

## 2025-08-21 PROCEDURE — 97140 MANUAL THERAPY 1/> REGIONS: CPT

## 2025-08-26 ENCOUNTER — TREATMENT (OUTPATIENT)
Dept: PHYSICAL THERAPY | Facility: CLINIC | Age: 65
End: 2025-08-26
Payer: MEDICARE

## 2025-08-26 DIAGNOSIS — M62.9 MUSCULOSKELETAL DISORDER INVOLVING UPPER TRAPEZIUS MUSCLE: ICD-10-CM

## 2025-08-26 DIAGNOSIS — M54.2 NECK PAIN ON RIGHT SIDE: ICD-10-CM

## 2025-08-26 DIAGNOSIS — M53.82 MUSCULOSKELETAL DISORDER OF THE SUBOCCIPITAL: ICD-10-CM

## 2025-08-26 DIAGNOSIS — M54.2 PAIN, NECK: Primary | ICD-10-CM

## 2025-08-26 PROCEDURE — 97140 MANUAL THERAPY 1/> REGIONS: CPT

## 2025-08-28 ENCOUNTER — TREATMENT (OUTPATIENT)
Dept: PHYSICAL THERAPY | Facility: CLINIC | Age: 65
End: 2025-08-28
Payer: MEDICARE

## 2025-08-28 DIAGNOSIS — M53.82 MUSCULOSKELETAL DISORDER OF THE SUBOCCIPITAL: ICD-10-CM

## 2025-08-28 DIAGNOSIS — M62.9 MUSCULOSKELETAL DISORDER INVOLVING UPPER TRAPEZIUS MUSCLE: ICD-10-CM

## 2025-08-28 DIAGNOSIS — M54.2 NECK PAIN ON RIGHT SIDE: ICD-10-CM

## 2025-08-28 DIAGNOSIS — M54.2 PAIN, NECK: Primary | ICD-10-CM

## 2025-08-28 PROCEDURE — 97140 MANUAL THERAPY 1/> REGIONS: CPT
